# Patient Record
Sex: FEMALE | Race: WHITE | URBAN - METROPOLITAN AREA
[De-identification: names, ages, dates, MRNs, and addresses within clinical notes are randomized per-mention and may not be internally consistent; named-entity substitution may affect disease eponyms.]

---

## 2019-11-05 ENCOUNTER — OPTICAL OFFICE (OUTPATIENT)
Dept: URBAN - METROPOLITAN AREA CLINIC 146 | Facility: CLINIC | Age: 19
Setting detail: OPHTHALMOLOGY
End: 2019-11-05
Payer: COMMERCIAL

## 2019-11-05 ENCOUNTER — DOCTOR'S OFFICE (OUTPATIENT)
Dept: URBAN - METROPOLITAN AREA CLINIC 137 | Facility: CLINIC | Age: 19
Setting detail: OPHTHALMOLOGY
End: 2019-11-05
Payer: COMMERCIAL

## 2019-11-05 DIAGNOSIS — H52.223: ICD-10-CM

## 2019-11-05 PROCEDURE — V2020 VISION SVCS FRAMES PURCHASES: HCPCS | Performed by: OPTOMETRIST

## 2019-11-05 PROCEDURE — V2104 SPHEROCYLINDR 4.00D/2.12-4D: HCPCS | Performed by: OPTOMETRIST

## 2019-11-05 PROCEDURE — V2103 SPHEROCYLINDR 4.00D/12-2.00D: HCPCS | Performed by: OPTOMETRIST

## 2019-11-05 PROCEDURE — 92004 COMPRE OPH EXAM NEW PT 1/>: CPT | Performed by: OPTOMETRIST

## 2019-11-05 ASSESSMENT — REFRACTION_CURRENTRX
OS_OVR_VA: 20/
OD_CYLINDER: -1.50
OS_SPHERE: -2.25
OD_OVR_VA: 20/
OD_OVR_VA: 20/
OD_SPHERE: -2.75
OD_AXIS: 001
OS_AXIS: 107
OS_CYLINDER: -0.50
OD_OVR_VA: 20/
OS_OVR_VA: 20/
OS_OVR_VA: 20/

## 2019-11-05 ASSESSMENT — REFRACTION_MANIFEST
OS_VA3: 20/
OU_VA: 20/
OS_VA3: 20/
OS_VA1: 20/20
OS_SPHERE: -2.25
OS_VA1: 20/
OD_SPHERE: -3.00
OS_VA2: 20/
OD_VA3: 20/
OD_VA3: 20/
OD_AXIS: 5
OS_AXIS: 120
OS_CYLINDER: -0.25
OD_VA1: 20/
OD_VA2: 20/
OD_VA2: 20/
OU_VA: 20/
OD_VA1: 20/20
OS_VA2: 20/
OD_CYLINDER: -2.25

## 2019-11-05 ASSESSMENT — AXIALLENGTH_DERIVED
OS_AL: 24.4559
OD_AL: 25.1589
OS_AL: 24.5084
OD_AL: 24.885

## 2019-11-05 ASSESSMENT — SPHEQUIV_DERIVED
OD_SPHEQUIV: -3.5
OS_SPHEQUIV: -2.5
OD_SPHEQUIV: -4.125
OS_SPHEQUIV: -2.375

## 2019-11-05 ASSESSMENT — KERATOMETRY
OD_K1POWER_DIOPTERS: 42.50
OS_AXISANGLE_DEGREES: 174
OS_K1POWER_DIOPTERS: 43.25
OS_K2POWER_DIOPTERS: 44.25
OD_K2POWER_DIOPTERS: 45.25
OD_AXISANGLE_DEGREES: 180

## 2019-11-05 ASSESSMENT — VISUAL ACUITY
OS_BCVA: 20/30-1
OD_BCVA: 20/30

## 2019-11-05 ASSESSMENT — REFRACTION_AUTOREFRACTION
OD_AXIS: 179
OS_SPHERE: -2.00
OS_AXIS: 147
OS_CYLINDER: -1.00
OD_SPHERE: -2.50
OD_CYLINDER: -2.00

## 2019-11-05 ASSESSMENT — CONFRONTATIONAL VISUAL FIELD TEST (CVF)
OD_FINDINGS: FULL
OS_FINDINGS: FULL

## 2020-09-06 ENCOUNTER — HOSPITAL ENCOUNTER (EMERGENCY)
Facility: HOSPITAL | Age: 20
Discharge: HOME | End: 2020-09-06
Attending: EMERGENCY MEDICINE | Admitting: EMERGENCY MEDICINE
Payer: COMMERCIAL

## 2020-09-06 VITALS
HEART RATE: 84 BPM | BODY MASS INDEX: 22.31 KG/M2 | HEIGHT: 62 IN | SYSTOLIC BLOOD PRESSURE: 132 MMHG | DIASTOLIC BLOOD PRESSURE: 88 MMHG | OXYGEN SATURATION: 98 % | WEIGHT: 121.25 LBS | RESPIRATION RATE: 16 BRPM | TEMPERATURE: 97.9 F

## 2020-09-06 DIAGNOSIS — F41.9 ANXIETY: Primary | ICD-10-CM

## 2020-09-06 DIAGNOSIS — Z76.0 ENCOUNTER FOR MEDICATION REFILL: ICD-10-CM

## 2020-09-06 DIAGNOSIS — F32.A DEPRESSION: ICD-10-CM

## 2020-09-06 PROCEDURE — 99281 EMR DPT VST MAYX REQ PHY/QHP: CPT | Performed by: EMERGENCY MEDICINE

## 2020-09-06 PROCEDURE — 99281 EMR DPT VST MAYX REQ PHY/QHP: CPT

## 2020-09-06 RX ORDER — HYDROXYZINE PAMOATE 25 MG/1
25 CAPSULE ORAL
Qty: 30 CAPSULE | Refills: 0 | Status: SHIPPED | OUTPATIENT
Start: 2020-09-06 | End: 2020-10-06

## 2020-09-06 RX ORDER — VENLAFAXINE HYDROCHLORIDE 75 MG/1
75 CAPSULE, EXTENDED RELEASE ORAL DAILY
COMMUNITY
End: 2020-09-08

## 2020-09-06 RX ORDER — HYDROXYZINE PAMOATE 25 MG/1
25 CAPSULE ORAL DAILY
COMMUNITY
End: 2020-09-08

## 2020-09-06 RX ORDER — HYDROXYZINE HYDROCHLORIDE 25 MG/1
25 TABLET, FILM COATED ORAL ONCE
Status: COMPLETED | OUTPATIENT
Start: 2020-09-06 | End: 2020-09-06

## 2020-09-06 RX ORDER — VENLAFAXINE HYDROCHLORIDE 75 MG/1
75 CAPSULE, EXTENDED RELEASE ORAL DAILY
Qty: 30 CAPSULE | Refills: 0 | Status: SHIPPED | OUTPATIENT
Start: 2020-09-06 | End: 2020-10-06

## 2020-09-06 RX ADMIN — HYDROXYZINE HYDROCHLORIDE 25 MG: 25 TABLET ORAL at 20:34

## 2020-09-07 NOTE — DISCHARGE INSTRUCTIONS
Please make sure to follow-up with your psychiatrist in order to obtain more medications  If you have any worsening symptoms including thoughts of harming herself, harming other people, or anything that concern to, please return to the emergency department as he could be signs of worsening illness

## 2020-09-07 NOTE — ED PROVIDER NOTES
History  Chief Complaint   Patient presents with    Medication Refill     Pt presents to ED from home unable to refill psych meds  Effexor 75mg daily and vistril unknown dose  HPI   20-year-old female with history of depression, anxiety, migraines presenting to the emergency department with request for refilling her medications  Patient states that she has severe depression and anxiety which is normally managed with Effexor and Vistaril  Since the pandemic started, patient has been having trouble arranging telemedicine counseling with her psychiatrist and counselors  Patient has been without her medication for at least 1 month though has had several extra tabs of the Effexor which she has been saving  Patient presents because she feels that her depression is getting worse  Denies suicidal ideation or homicidal ideation, or hallucinations, but states that she starting to feel irritable and agitated  Prior to this, patient was in her state of usual health  Denies fever, headache, visual changes, neck pain, chest pain, shortness of breath, abdominal pain, nausea, vomiting, urinary symptoms, changes in stool, leg pain or leg swelling, rash  No sick contacts, no recent travel  Prior to Admission Medications   Prescriptions Last Dose Informant Patient Reported? Taking?   hydrOXYzine pamoate (VISTARIL) 25 mg capsule   Yes Yes   Sig: Take 25 mg by mouth daily   venlafaxine (EFFEXOR-XR) 75 mg 24 hr capsule   Yes Yes   Sig: Take 75 mg by mouth daily      Facility-Administered Medications: None       Past Medical History:   Diagnosis Date    Anxiety     Depression     Migraines        History reviewed  No pertinent surgical history  History reviewed  No pertinent family history  I have reviewed and agree with the history as documented      E-Cigarette/Vaping    E-Cigarette Use Never User      E-Cigarette/Vaping Substances     Social History     Tobacco Use    Smoking status: Never Smoker    Smokeless tobacco: Never Used   Substance Use Topics    Alcohol use: Not Currently    Drug use: Not Currently       Review of Systems   Constitutional: Negative for chills and fever  Respiratory: Negative for apnea, cough, choking, chest tightness, shortness of breath, wheezing and stridor  Cardiovascular: Negative for chest pain and leg swelling  Gastrointestinal: Negative for abdominal distention, abdominal pain, constipation, diarrhea, nausea and rectal pain  Genitourinary: Negative for dysuria and hematuria  Musculoskeletal: Negative for back pain, gait problem and neck pain  Skin: Negative for color change, pallor, rash and wound  Neurological: Negative for dizziness, tremors, seizures, syncope, facial asymmetry, speech difficulty, weakness, light-headedness, numbness and headaches  Psychiatric/Behavioral: Positive for agitation and dysphoric mood  Negative for self-injury and suicidal ideas  The patient is nervous/anxious  Physical Exam  Physical Exam  Vitals signs and nursing note reviewed  Constitutional:       General: She is not in acute distress  Appearance: She is well-developed  She is not diaphoretic  HENT:      Head: Normocephalic and atraumatic  Right Ear: External ear normal       Left Ear: External ear normal       Nose: Nose normal    Eyes:      General:         Right eye: No discharge  Left eye: No discharge  Conjunctiva/sclera: Conjunctivae normal       Pupils: Pupils are equal, round, and reactive to light  Neck:      Musculoskeletal: Normal range of motion and neck supple  Cardiovascular:      Rate and Rhythm: Normal rate and regular rhythm  Heart sounds: Normal heart sounds  No murmur  No friction rub  No gallop  Pulmonary:      Effort: Pulmonary effort is normal  No respiratory distress  Breath sounds: Normal breath sounds  No stridor  No wheezing or rales  Chest:      Chest wall: No tenderness     Abdominal:      General: Bowel sounds are normal  There is no distension  Palpations: Abdomen is soft  There is no mass  Tenderness: There is no abdominal tenderness  There is no guarding or rebound  Hernia: No hernia is present  Musculoskeletal: Normal range of motion  General: No tenderness or deformity  Skin:     General: Skin is warm and dry  Capillary Refill: Capillary refill takes less than 2 seconds  Neurological:      Mental Status: She is alert and oriented to person, place, and time  Psychiatric:      Comments: Blunt/flat affect         Vital Signs  ED Triage Vitals   Temperature Pulse Respirations Blood Pressure SpO2   09/06/20 1956 09/06/20 1955 09/06/20 1955 09/06/20 1955 09/06/20 1955   97 9 °F (36 6 °C) 84 16 132/88 98 %      Temp Source Heart Rate Source Patient Position - Orthostatic VS BP Location FiO2 (%)   09/06/20 1956 09/06/20 1955 09/06/20 1955 09/06/20 1955 --   Oral Monitor Lying Right arm       Pain Score       --                  Vitals:    09/06/20 1955   BP: 132/88   Pulse: 84   Patient Position - Orthostatic VS: Lying         Visual Acuity      ED Medications  Medications   hydrOXYzine HCL (ATARAX) tablet 25 mg (has no administration in time range)       Diagnostic Studies  Results Reviewed     None                 No orders to display              Procedures  Procedures         ED Course  ED Course as of Sep 06 2031   Daiana Madrigal Sep 06, 2020   213 57-year-old female presenting for medication refills  2003 Vital signs WNL  Blood Pressure: 132/88   2028 Physical exam is grossly unremarkable  2028 Differential diagnosis includes encounter for medication refill, anxiety, depression  Lab work and imaging is not indicated at this time  Reviewed patient's lab work to confirm home doses of medications  Prescriptions were sent to pharmacy  At this time, since it is late, patient is requesting her nighttime Atarax, which was provided        2029 Upon discharge, patient was fully alert, oriented, GCS 15, ambulatory, without any further complaints  MDM      Disposition  Final diagnoses:   Anxiety   Depression   Encounter for medication refill     Time reflects when diagnosis was documented in both MDM as applicable and the Disposition within this note     Time User Action Codes Description Comment    9/6/2020  8:25 PM Julito Mantis Add [F41 9] Anxiety     9/6/2020  8:25 PM Orvin Soulier [F32 9] Depression     9/6/2020  8:25 PM Julito Mantis Add [Z76 0] Encounter for medication refill       ED Disposition     ED Disposition Condition Date/Time Comment    Discharge Stable Sun Sep 6, 2020  8:25 PM Megan Plasencia discharge to home/self care  Follow-up Information     Follow up With Specialties Details Why Contact Info    Psychiatry  Schedule an appointment as soon as possible for a visit in 2 days  Please follow up with your counselors for further refills  Patient's Medications   Discharge Prescriptions    HYDROXYZINE PAMOATE (VISTARIL) 25 MG CAPSULE    Take 1 capsule (25 mg total) by mouth daily at bedtime       Start Date: 9/6/2020  End Date: 10/6/2020       Order Dose: 25 mg       Quantity: 30 capsule    Refills: 0    VENLAFAXINE (EFFEXOR-XR) 75 MG 24 HR CAPSULE    Take 1 capsule (75 mg total) by mouth daily       Start Date: 9/6/2020  End Date: 10/6/2020       Order Dose: 75 mg       Quantity: 30 capsule    Refills: 0     No discharge procedures on file      PDMP Review     None          ED Provider  Electronically Signed by           Le Richards MD  09/06/20 2031

## 2020-09-08 ENCOUNTER — HOSPITAL ENCOUNTER (EMERGENCY)
Facility: HOSPITAL | Age: 20
Discharge: HOME | End: 2020-09-08
Attending: EMERGENCY MEDICINE | Admitting: EMERGENCY MEDICINE
Payer: COMMERCIAL

## 2020-09-08 ENCOUNTER — APPOINTMENT (EMERGENCY)
Dept: RADIOLOGY | Facility: HOSPITAL | Age: 20
End: 2020-09-08
Payer: COMMERCIAL

## 2020-09-08 VITALS
HEART RATE: 89 BPM | BODY MASS INDEX: 21.62 KG/M2 | DIASTOLIC BLOOD PRESSURE: 80 MMHG | TEMPERATURE: 97.6 F | RESPIRATION RATE: 16 BRPM | OXYGEN SATURATION: 100 % | WEIGHT: 118.2 LBS | SYSTOLIC BLOOD PRESSURE: 126 MMHG

## 2020-09-08 DIAGNOSIS — F41.9 ANXIETY: ICD-10-CM

## 2020-09-08 DIAGNOSIS — E86.0 MILD DEHYDRATION: Primary | ICD-10-CM

## 2020-09-08 DIAGNOSIS — Z88.9 HX OF ALLERGIC REACTION: ICD-10-CM

## 2020-09-08 LAB
ALBUMIN SERPL BCP-MCNC: 5 G/DL (ref 3.4–4.8)
ALP SERPL-CCNC: 74.2 U/L (ref 35–140)
ALT SERPL W P-5'-P-CCNC: 15 U/L (ref 5–54)
ANION GAP SERPL CALCULATED.3IONS-SCNC: 9 MMOL/L (ref 4–13)
AST SERPL W P-5'-P-CCNC: 16 U/L (ref 15–41)
BASOPHILS # BLD AUTO: 0.03 THOUSANDS/ΜL (ref 0–0.1)
BASOPHILS NFR BLD AUTO: 0 % (ref 0–1)
BILIRUB SERPL-MCNC: 0.29 MG/DL (ref 0.3–1.2)
BUN SERPL-MCNC: 14 MG/DL (ref 6–20)
CALCIUM SERPL-MCNC: 9.9 MG/DL (ref 8.4–10.2)
CHLORIDE SERPL-SCNC: 104 MMOL/L (ref 96–108)
CO2 SERPL-SCNC: 26 MMOL/L (ref 22–33)
CREAT SERPL-MCNC: 0.89 MG/DL (ref 0.4–1.1)
D DIMER PPP FEU-MCNC: <0.19 MG/L FEU (ref 0.19–0.49)
EOSINOPHIL # BLD AUTO: 0.03 THOUSAND/ΜL (ref 0–0.61)
EOSINOPHIL NFR BLD AUTO: 0 % (ref 0–6)
ERYTHROCYTE [DISTWIDTH] IN BLOOD BY AUTOMATED COUNT: 13.1 % (ref 11.6–15.1)
GFR SERPL CREATININE-BSD FRML MDRD: 93 ML/MIN/1.73SQ M
GLUCOSE SERPL-MCNC: 120 MG/DL (ref 65–140)
HCT VFR BLD AUTO: 42 % (ref 34.8–46.1)
HGB BLD-MCNC: 14 G/DL (ref 11.5–15.4)
IMM GRANULOCYTES # BLD AUTO: 0.02 THOUSAND/UL (ref 0–0.2)
IMM GRANULOCYTES NFR BLD AUTO: 0 % (ref 0–2)
LYMPHOCYTES # BLD AUTO: 1.65 THOUSANDS/ΜL (ref 0.6–4.47)
LYMPHOCYTES NFR BLD AUTO: 23 % (ref 14–44)
MCH RBC QN AUTO: 27.3 PG (ref 26.8–34.3)
MCHC RBC AUTO-ENTMCNC: 33.3 G/DL (ref 31.4–37.4)
MCV RBC AUTO: 82 FL (ref 82–98)
MONOCYTES # BLD AUTO: 0.49 THOUSAND/ΜL (ref 0.17–1.22)
MONOCYTES NFR BLD AUTO: 7 % (ref 4–12)
NEUTROPHILS # BLD AUTO: 5.07 THOUSANDS/ΜL (ref 1.85–7.62)
NEUTS SEG NFR BLD AUTO: 70 % (ref 43–75)
PLATELET # BLD AUTO: 310 THOUSANDS/UL (ref 149–390)
PMV BLD AUTO: 9.3 FL (ref 8.9–12.7)
POTASSIUM SERPL-SCNC: 3.9 MMOL/L (ref 3.5–5)
PROT SERPL-MCNC: 7.6 G/DL (ref 6.4–8.3)
RBC # BLD AUTO: 5.13 MILLION/UL (ref 3.81–5.12)
SODIUM SERPL-SCNC: 139 MMOL/L (ref 133–145)
WBC # BLD AUTO: 7.29 THOUSAND/UL (ref 4.31–10.16)

## 2020-09-08 PROCEDURE — 99282 EMERGENCY DEPT VISIT SF MDM: CPT | Performed by: PHYSICIAN ASSISTANT

## 2020-09-08 PROCEDURE — 99284 EMERGENCY DEPT VISIT MOD MDM: CPT

## 2020-09-08 PROCEDURE — 93005 ELECTROCARDIOGRAM TRACING: CPT

## 2020-09-08 PROCEDURE — 85025 COMPLETE CBC W/AUTO DIFF WBC: CPT | Performed by: PHYSICIAN ASSISTANT

## 2020-09-08 PROCEDURE — 71046 X-RAY EXAM CHEST 2 VIEWS: CPT

## 2020-09-08 PROCEDURE — 36415 COLL VENOUS BLD VENIPUNCTURE: CPT | Performed by: PHYSICIAN ASSISTANT

## 2020-09-08 PROCEDURE — 85379 FIBRIN DEGRADATION QUANT: CPT | Performed by: PHYSICIAN ASSISTANT

## 2020-09-08 PROCEDURE — 96361 HYDRATE IV INFUSION ADD-ON: CPT

## 2020-09-08 PROCEDURE — 96360 HYDRATION IV INFUSION INIT: CPT

## 2020-09-08 PROCEDURE — 80053 COMPREHEN METABOLIC PANEL: CPT | Performed by: PHYSICIAN ASSISTANT

## 2020-09-08 RX ADMIN — SODIUM CHLORIDE 1000 ML: 0.9 INJECTION, SOLUTION INTRAVENOUS at 15:06

## 2020-09-08 NOTE — Clinical Note
Jackson Kaufman was seen and treated in our emergency department on 9/8/2020  Diagnosis:     Lali    She may return on this date: 09/09/2020         If you have any questions or concerns, please don't hesitate to call        Carmencita Davies PA-C    ______________________________           _______________          _______________  Hospital Representative                              Date                                Time

## 2020-09-08 NOTE — ED PROVIDER NOTES
History  Chief Complaint   Patient presents with    Allergic Reaction     presents with multiple complaits, "cold sweats" this AM, dizziness, states "I think I'm having an allergic reaction", states "tingling" of throat and roof of mouth and facial swelling since this AM  Restarted effexor yesterday, otherwise no new exposure  49-year-old female comes in today concern for an allergic reaction  She reports that since yesterday she has been having some dizziness which she describes as pre-syncopal, tingling of the roof of her mouth, perioral numbness, bilateral hand tingling, occasional feelings of chest tightness and some shortness of breath  She also felt as though she had swelling of her bilateral cheeks  She thought that this was an allergic reaction  She did not take any Benadryl prior to arrival   She reports a prior history of possible allergies to citrus when she was younger, however she was never tested  She reports that yesterday she drank fruit punch, gatorade, and ate an asian bowl  She has had the fruit punch and gatorade before, but not the asian bowl  Reports she has not been eating or drinking well lately due to her depression and starting a new job  She reports she was seen in the ED 4-5 days ago because she was not doing well with her mental health and wanted to restart her effexor  She hadn't been on this for 1 5 months, but restarted it at her full dose 3-4 days ago  She has never had any problems with her Effexor in the past   She did make an appointment with her psychiatrist, however she is unsure when it is  She does report feeling mildly anxious  Prior to Admission Medications   Prescriptions Last Dose Informant Patient Reported?  Taking?   hydrOXYzine pamoate (VISTARIL) 25 mg capsule 9/7/2020 at Unknown time  No Yes   Sig: Take 1 capsule (25 mg total) by mouth daily at bedtime   venlafaxine (EFFEXOR-XR) 75 mg 24 hr capsule 9/8/2020 at Unknown time  No Yes   Sig: Take 1 capsule (75 mg total) by mouth daily      Facility-Administered Medications: None       Past Medical History:   Diagnosis Date    Anxiety     Depression     Migraines        History reviewed  No pertinent surgical history  History reviewed  No pertinent family history  I have reviewed and agree with the history as documented  E-Cigarette/Vaping    E-Cigarette Use Never User      E-Cigarette/Vaping Substances     Social History     Tobacco Use    Smoking status: Never Smoker    Smokeless tobacco: Never Used   Substance Use Topics    Alcohol use: Not Currently    Drug use: Not Currently       Review of Systems   Constitutional: Positive for appetite change  Negative for activity change  Eyes: Negative for visual disturbance  Respiratory: Positive for chest tightness and shortness of breath  Cardiovascular: Negative for chest pain  Musculoskeletal: Negative for back pain  Skin: Negative for color change  Neurological: Positive for dizziness, light-headedness and numbness  Negative for headaches  Psychiatric/Behavioral: Negative for behavioral problems  Physical Exam  Physical Exam  Constitutional:       General: She is not in acute distress  Appearance: She is well-developed  HENT:      Head: Normocephalic and atraumatic  Right Ear: Tympanic membrane normal       Left Ear: Tympanic membrane normal       Nose: Nose normal       Mouth/Throat:      Mouth: Mucous membranes are moist       Pharynx: No oropharyngeal exudate  Eyes:      Conjunctiva/sclera: Conjunctivae normal    Cardiovascular:      Rate and Rhythm: Regular rhythm  Tachycardia present  Heart sounds: Normal heart sounds  No murmur  Pulmonary:      Effort: Pulmonary effort is normal  No respiratory distress  Breath sounds: Normal breath sounds  Abdominal:      General: Abdomen is flat  Bowel sounds are normal  There is no distension  Palpations: Abdomen is soft  Tenderness:  There is no abdominal tenderness  Musculoskeletal: Normal range of motion  Skin:     General: Skin is warm and dry  Findings: No rash  Neurological:      Mental Status: She is alert and oriented to person, place, and time     Psychiatric:         Behavior: Behavior normal          Vital Signs  ED Triage Vitals [09/08/20 1445]   Temperature Pulse Respirations Blood Pressure SpO2   97 6 °F (36 4 °C) (!) 112 16 146/89 96 %      Temp Source Heart Rate Source Patient Position - Orthostatic VS BP Location FiO2 (%)   Tympanic Monitor Sitting Left arm --      Pain Score       --           Vitals:    09/08/20 1445 09/08/20 1611   BP: 146/89 126/80   Pulse: (!) 112 89   Patient Position - Orthostatic VS: Sitting Lying         Visual Acuity      ED Medications  Medications   sodium chloride 0 9 % bolus 1,000 mL (1,000 mL Intravenous New Bag 9/8/20 1506)       Diagnostic Studies  Results Reviewed     Procedure Component Value Units Date/Time    D-Dimer [[id number]]  (Abnormal) Collected:  09/08/20 1506    Lab Status:  Final result Specimen:  Blood from Arm, Right Updated:  09/08/20 1538     D-Dimer, Quant  <0 19 mg/L [facility]     Comprehensive metabolic panel [[id number]]  (Abnormal) Collected:  09/08/20 1506    Lab Status:  Final result Specimen:  Blood from Arm, Right Updated:  09/08/20 1534     Sodium 139 mmol/L      Potassium 3 9 mmol/L      Chloride 104 mmol/L      CO2 26 mmol/L      ANION GAP 9 mmol/L      BUN 14 mg/dL      Creatinine 0 89 mg/dL      Glucose 120 mg/dL      Calcium 9 9 mg/dL      AST 16 U/L      ALT 15 U/L      Alkaline Phosphatase 74 2 U/L      Total Protein 7 6 g/dL      Albumin 5 0 g/dL      Total Bilirubin 0 29 mg/dL      eGFR 93 ml/min/1 73sq m     Narrative:       [place] guidelines for Chronic Kidney Disease (CKD):     Stage 1 with normal or high GFR (GFR > 90 mL/min/1 73 square meters)    Stage 2 Mild CKD (GFR = 60-89 mL/min/1 73 square meters)    Stage 3A Moderate CKD (GFR = 45-59 mL/min/1 73 square meters)    Stage 3B Moderate CKD (GFR = 30-44 mL/min/1 73 square meters)    Stage 4 Severe CKD (GFR = 15-29 mL/min/1 73 square meters)    Stage 5 End Stage CKD (GFR <15 mL/min/1 73 square meters)  Note: GFR calculation is accurate only with a steady state creatinine    CBC and differential [[id number]]  (Abnormal) Collected:  09/08/20 1506    Lab Status:  Final result Specimen:  Blood from Arm, Right Updated:  09/08/20 1512     WBC 7 29 Thousand/uL      RBC 5 13 Million/uL      Hemoglobin 14 0 g/dL      Hematocrit 42 0 %      MCV 82 fL      MCH 27 3 pg      MCHC 33 3 g/dL      RDW 13 1 %      MPV 9 3 fL      Platelets 012 Thousands/uL      Neutrophils Relative 70 %      Immat GRANS % 0 %      Lymphocytes Relative 23 %      Monocytes Relative 7 %      Eosinophils Relative 0 %      Basophils Relative 0 %      Neutrophils Absolute 5 07 Thousands/µL      Immature Grans Absolute 0 02 Thousand/uL      Lymphocytes Absolute 1 65 Thousands/µL      Monocytes Absolute 0 49 Thousand/µL      Eosinophils Absolute 0 03 Thousand/µL      Basophils Absolute 0 03 Thousands/µL                  XR chest 2 views   Final Result by Umang Villagomez MD (09/08 9047)      No acute cardiopulmonary disease  Workstation performed: [id number]                    Procedures  Procedures         ED Course  ED Course as of Sep 08 1638   Tue Sep 08, 2020   1608 Patient reports improvement of her symptoms after 1L saline  Asks for a work note and referral to allergist to be tested for citrus allergy                                          MDM  Number of Diagnoses or Management Options  Anxiety:   Hx of allergic reaction:   Mild dehydration:   Diagnosis management comments: Patient who had worsening of her mood recently decided to restart her FX or without seeing her psychiatrist   Patient notes a decreased appetite recently and has not been drinking well    She arrives slightly tachycardic which improved after 1 L NS favoring dehydration  Her mouth tingling and hand tingling is likely related to anxiety and hyperventilation rather than an allergic reaction  She did not have any type of urticarial rash and did not take any Benadryl  Disposition  Final diagnoses:   Mild dehydration   Anxiety   Hx of allergic reaction     Time reflects when diagnosis was documented in both MDM as applicable and the Disposition within this note     Time User Action Codes Description Comment    9/8/2020  4:31 PM Dorrene Bailer Add [E86 0] Mild dehydration     9/8/2020  4:31 PM Dorrene Bailer Add [F41 9] Anxiety     9/8/2020  4:32 PM Dorrene Bailer Add [Z88 9] Hx of allergic reaction       ED Disposition     ED Disposition Condition Date/Time Comment    Discharge Stable Tue Sep 8, 2020  4:30 PM Roger Dye discharge to home/self care              Follow-up Information     Follow up With Specialties Details Why Contact Info    Baljit Jeffries MD Allergy   [address]  [address] [place] [address]  [phone]      Your Psychiatrist  Schedule an appointment as soon as possible for a visit             Patient's Medications   Discharge Prescriptions    No medications on file         PDMP Review     None          ED Provider  Electronically Signed by           Carlos Cobb PA-C  09/08/20 6989

## 2020-09-09 LAB
ATRIAL RATE: 107 BPM
P AXIS: 64 DEGREES
PR INTERVAL: 132 MS
QRS AXIS: 63 DEGREES
QRSD INTERVAL: 82 MS
QT INTERVAL: 449 MS
QTC INTERVAL: 591 MS
T WAVE AXIS: 254 DEGREES
VENTRICULAR RATE: 104 BPM

## 2020-09-09 PROCEDURE — 93010 ELECTROCARDIOGRAM REPORT: CPT | Performed by: INTERNAL MEDICINE

## 2021-01-21 ENCOUNTER — OPTICAL OFFICE (OUTPATIENT)
Dept: URBAN - METROPOLITAN AREA CLINIC 146 | Facility: CLINIC | Age: 21
Setting detail: OPHTHALMOLOGY
End: 2021-01-21
Payer: COMMERCIAL

## 2021-01-21 ENCOUNTER — DOCTOR'S OFFICE (OUTPATIENT)
Dept: URBAN - METROPOLITAN AREA CLINIC 137 | Facility: CLINIC | Age: 21
Setting detail: OPHTHALMOLOGY
End: 2021-01-21
Payer: COMMERCIAL

## 2021-01-21 DIAGNOSIS — H52.223: ICD-10-CM

## 2021-01-21 DIAGNOSIS — H52.13: ICD-10-CM

## 2021-01-21 PROCEDURE — V2103 SPHEROCYLINDR 4.00D/12-2.00D: HCPCS | Performed by: OPTOMETRIST

## 2021-01-21 PROCEDURE — 92014 COMPRE OPH EXAM EST PT 1/>: CPT | Performed by: OPTOMETRIST

## 2021-01-21 PROCEDURE — V2020 VISION SVCS FRAMES PURCHASES: HCPCS | Performed by: OPTOMETRIST

## 2021-01-21 ASSESSMENT — REFRACTION_CURRENTRX
OD_AXIS: 005
OS_OVR_VA: 20/
OS_VPRISM_DIRECTION: SV
OS_CYLINDER: -0.25
OS_AXIS: 120
OS_SPHERE: -2.25
OD_OVR_VA: 20/
OD_VPRISM_DIRECTION: SV
OD_CYLINDER: -2.25
OD_SPHERE: -3.00

## 2021-01-21 ASSESSMENT — REFRACTION_MANIFEST
OD_VA1: 20/20
OD_SPHERE: -3.00
OD_CYLINDER: -2.25
OD_CYLINDER: -2.00
OS_AXIS: 150
OD_SPHERE: -2.75
OS_VA1: 20/20
OD_AXIS: 5
OS_CYLINDER: -0.25
OD_VA1: 20/20
OD_AXIS: 175
OS_VA1: 20/20
OS_CYLINDER: -0.50
OS_SPHERE: -2.25
OS_SPHERE: -2.25
OS_AXIS: 120

## 2021-01-21 ASSESSMENT — SPHEQUIV_DERIVED
OD_SPHEQUIV: -3.75
OS_SPHEQUIV: -2.375
OD_SPHEQUIV: -4.125
OS_SPHEQUIV: -2.5
OD_SPHEQUIV: -4
OS_SPHEQUIV: -2.625

## 2021-01-21 ASSESSMENT — KERATOMETRY
OD_K2POWER_DIOPTERS: 45.25
OS_AXISANGLE_DEGREES: 174
OD_AXISANGLE_DEGREES: 180
OD_K1POWER_DIOPTERS: 42.50
OS_K1POWER_DIOPTERS: 43.25
OS_K2POWER_DIOPTERS: 44.25

## 2021-01-21 ASSESSMENT — REFRACTION_AUTOREFRACTION
OS_CYLINDER: -0.25
OD_CYLINDER: -2.00
OD_SPHERE: -3.00
OD_AXIS: 176
OS_SPHERE: -2.50
OS_AXIS: 127

## 2021-01-21 ASSESSMENT — AXIALLENGTH_DERIVED
OS_AL: 24.4559
OS_AL: 24.561
OD_AL: 25.1589
OS_AL: 24.5084
OD_AL: 24.9938
OD_AL: 25.1036

## 2021-01-21 ASSESSMENT — TONOMETRY
OS_IOP_MMHG: 15
OD_IOP_MMHG: 15

## 2021-01-21 ASSESSMENT — CONFRONTATIONAL VISUAL FIELD TEST (CVF)
OS_FINDINGS: FULL
OD_FINDINGS: FULL

## 2021-01-21 ASSESSMENT — VISUAL ACUITY
OS_BCVA: 20/30
OD_BCVA: 20/25

## 2021-08-04 ENCOUNTER — HOSPITAL ENCOUNTER (EMERGENCY)
Facility: HOSPITAL | Age: 21
Discharge: HOME | End: 2021-08-04
Attending: EMERGENCY MEDICINE | Admitting: EMERGENCY MEDICINE
Payer: COMMERCIAL

## 2021-08-04 VITALS
TEMPERATURE: 98.5 F | DIASTOLIC BLOOD PRESSURE: 70 MMHG | SYSTOLIC BLOOD PRESSURE: 120 MMHG | HEART RATE: 80 BPM | RESPIRATION RATE: 16 BRPM | OXYGEN SATURATION: 99 %

## 2021-08-04 DIAGNOSIS — N39.0 ACUTE UTI: Primary | ICD-10-CM

## 2021-08-04 DIAGNOSIS — R10.84 GENERALIZED ABDOMINAL PAIN: ICD-10-CM

## 2021-08-04 LAB
ALBUMIN SERPL BCP-MCNC: 4.6 G/DL (ref 3.5–5)
ALP SERPL-CCNC: 81 U/L (ref 46–116)
ALT SERPL W P-5'-P-CCNC: 26 U/L (ref 12–78)
ANION GAP SERPL CALCULATED.3IONS-SCNC: 8 MMOL/L (ref 4–13)
AST SERPL W P-5'-P-CCNC: 16 U/L (ref 5–45)
BACTERIA UR QL AUTO: ABNORMAL /HPF
BASOPHILS # BLD AUTO: 0.04 THOUSANDS/ΜL (ref 0–0.1)
BASOPHILS NFR BLD AUTO: 1 % (ref 0–1)
BILIRUB SERPL-MCNC: 0.32 MG/DL (ref 0.2–1)
BILIRUB UR QL STRIP: NEGATIVE
BUN SERPL-MCNC: 11 MG/DL (ref 5–25)
CALCIUM SERPL-MCNC: 9.5 MG/DL (ref 8.3–10.1)
CHLORIDE SERPL-SCNC: 104 MMOL/L (ref 100–108)
CLARITY UR: CLEAR
CO2 SERPL-SCNC: 28 MMOL/L (ref 21–32)
COLOR UR: YELLOW
CREAT SERPL-MCNC: 1.04 MG/DL (ref 0.6–1.3)
EOSINOPHIL # BLD AUTO: 0.07 THOUSAND/ΜL (ref 0–0.61)
EOSINOPHIL NFR BLD AUTO: 1 % (ref 0–6)
ERYTHROCYTE [DISTWIDTH] IN BLOOD BY AUTOMATED COUNT: 13.2 % (ref 11.6–15.1)
EXT PREG TEST URINE: NEGATIVE
EXT. CONTROL ED NAV: NORMAL
GFR SERPL CREATININE-BSD FRML MDRD: 76 ML/MIN/1.73SQ M
GLUCOSE SERPL-MCNC: 98 MG/DL (ref 65–140)
GLUCOSE UR STRIP-MCNC: NEGATIVE MG/DL
HCT VFR BLD AUTO: 43 % (ref 34.8–46.1)
HGB BLD-MCNC: 16.1 G/DL (ref 11.5–15.4)
HGB UR QL STRIP.AUTO: NEGATIVE
IMM GRANULOCYTES # BLD AUTO: 0.02 THOUSAND/UL (ref 0–0.2)
IMM GRANULOCYTES NFR BLD AUTO: 0 % (ref 0–2)
KETONES UR STRIP-MCNC: NEGATIVE MG/DL
LEUKOCYTE ESTERASE UR QL STRIP: ABNORMAL
LIPASE SERPL-CCNC: 138 U/L (ref 73–393)
LYMPHOCYTES # BLD AUTO: 2.23 THOUSANDS/ΜL (ref 0.6–4.47)
LYMPHOCYTES NFR BLD AUTO: 33 % (ref 14–44)
MCH RBC QN AUTO: 31.9 PG (ref 26.8–34.3)
MCHC RBC AUTO-ENTMCNC: 37.4 G/DL (ref 31.4–37.4)
MCV RBC AUTO: 85 FL (ref 82–98)
MONOCYTES # BLD AUTO: 0.58 THOUSAND/ΜL (ref 0.17–1.22)
MONOCYTES NFR BLD AUTO: 9 % (ref 4–12)
NEUTROPHILS # BLD AUTO: 3.88 THOUSANDS/ΜL (ref 1.85–7.62)
NEUTS SEG NFR BLD AUTO: 56 % (ref 43–75)
NITRITE UR QL STRIP: NEGATIVE
NON-SQ EPI CELLS URNS QL MICRO: ABNORMAL /HPF
NRBC BLD AUTO-RTO: 0 /100 WBCS
PH UR STRIP.AUTO: 7.5 [PH]
PLATELET # BLD AUTO: 317 THOUSANDS/UL (ref 149–390)
PMV BLD AUTO: 9.3 FL (ref 8.9–12.7)
POTASSIUM SERPL-SCNC: 3.9 MMOL/L (ref 3.5–5.3)
PROT SERPL-MCNC: 8.6 G/DL (ref 6.4–8.2)
PROT UR STRIP-MCNC: NEGATIVE MG/DL
RBC # BLD AUTO: 5.04 MILLION/UL (ref 3.81–5.12)
RBC #/AREA URNS AUTO: ABNORMAL /HPF
SODIUM SERPL-SCNC: 140 MMOL/L (ref 136–145)
SP GR UR STRIP.AUTO: 1.02 (ref 1–1.03)
TROPONIN I SERPL-MCNC: <0.02 NG/ML
UROBILINOGEN UR QL STRIP.AUTO: 0.2 E.U./DL
WBC # BLD AUTO: 6.82 THOUSAND/UL (ref 4.31–10.16)
WBC #/AREA URNS AUTO: ABNORMAL /HPF

## 2021-08-04 PROCEDURE — 87077 CULTURE AEROBIC IDENTIFY: CPT

## 2021-08-04 PROCEDURE — 84484 ASSAY OF TROPONIN QUANT: CPT | Performed by: EMERGENCY MEDICINE

## 2021-08-04 PROCEDURE — 87086 URINE CULTURE/COLONY COUNT: CPT

## 2021-08-04 PROCEDURE — 83690 ASSAY OF LIPASE: CPT

## 2021-08-04 PROCEDURE — 80053 COMPREHEN METABOLIC PANEL: CPT | Performed by: EMERGENCY MEDICINE

## 2021-08-04 PROCEDURE — 87186 SC STD MICRODIL/AGAR DIL: CPT

## 2021-08-04 PROCEDURE — 85025 COMPLETE CBC W/AUTO DIFF WBC: CPT | Performed by: EMERGENCY MEDICINE

## 2021-08-04 PROCEDURE — 81025 URINE PREGNANCY TEST: CPT

## 2021-08-04 PROCEDURE — 81001 URINALYSIS AUTO W/SCOPE: CPT

## 2021-08-04 PROCEDURE — 99284 EMERGENCY DEPT VISIT MOD MDM: CPT

## 2021-08-04 PROCEDURE — 36415 COLL VENOUS BLD VENIPUNCTURE: CPT

## 2021-08-04 PROCEDURE — 99284 EMERGENCY DEPT VISIT MOD MDM: CPT | Performed by: EMERGENCY MEDICINE

## 2021-08-04 RX ORDER — CEPHALEXIN 250 MG/1
500 CAPSULE ORAL ONCE
Status: COMPLETED | OUTPATIENT
Start: 2021-08-04 | End: 2021-08-04

## 2021-08-04 RX ORDER — FAMOTIDINE 20 MG/1
20 TABLET, FILM COATED ORAL 2 TIMES DAILY
Qty: 20 TABLET | Refills: 0 | Status: SHIPPED | OUTPATIENT
Start: 2021-08-04 | End: 2021-08-14

## 2021-08-04 RX ORDER — CEPHALEXIN 500 MG/1
500 CAPSULE ORAL EVERY 12 HOURS SCHEDULED
Qty: 14 CAPSULE | Refills: 0 | Status: SHIPPED | OUTPATIENT
Start: 2021-08-04 | End: 2021-08-11

## 2021-08-04 RX ADMIN — CEPHALEXIN 500 MG: 250 CAPSULE ORAL at 19:39

## 2021-08-04 NOTE — ED ATTENDING ATTESTATION
8/4/2021  IYoana DO, saw and evaluated the patient  I have discussed the patient with the resident/non-physician practitioner and agree with the resident's/non-physician practitioner's findings, Plan of Care, and MDM as documented in the resident's/non-physician practitioner's note, except where noted  All available labs and Radiology studies were reviewed  I was present for key portions of any procedure(s) performed by the resident/non-physician practitioner and I was immediately available to provide assistance  At this point I agree with the current assessment done in the Emergency Department  I have conducted an independent evaluation of this patient a history and physical is as follows:    24 yo F presenting to the ED w/ crampy intermittent abdominal pain for the past week  She admits to eating a poor diet, mostly fast food and other takeout foods due to her current living situation  No n/v/d  Hx of constipation, and anxiety  No fevers, chills, urinary symptoms, blood in stool, denies pregnancy  On physical exam: pt very well appearing, in NAD  mucous membranes moist   CTA b/l , heart RRR  Abdomen NT, ND, no R/R/G  Normal bowel sounds  Neuro intact, gcs 15  Cap refill < 2 sec, skin warm and dry  ED Course  ED Course as of Aug 04 2359   Wed Aug 04, 2021   1913 Pt noted to have a UTI, will start on keflex  Also start on pepcid for suspected gastritis vs ulcer                Critical Care Time  Procedures

## 2021-08-04 NOTE — Clinical Note
Nicki Negron was seen and treated in our emergency department on 8/4/2021  Diagnosis:     Isis Yang  may return to work on return date  She may return on this date: 08/05/2021    Nicki Negron was seen in our ED today (8/4/2021)  If you have any questions or concerns, please don't hesitate to call        Ena Hampton MD    ______________________________           _______________          _______________  [facility] Representative                              Date                                Time

## 2021-08-04 NOTE — ED PROVIDER NOTES
History  Chief Complaint   Patient presents with    Abdominal Pain     patient states shes been having abdominal pain for about a week  Pain comes and goes and is a sharp pain  +N/V  Anson Gamez comes to the ED today after one week of episodic, abdominal pain  Frequency is 2-3 times a day that happens both prior and after meals  She describes the pain as crampy most of the time but has felt sharp  She describes that she often has "issues with constipation"  She describes these issues as feeling cramps or feeling that she has to go to the bathroom but then is unable to pass a BM  She states that this frequency and quality of bowel movements has not changes since she has been experiencing symptoms  She denies any blood or black components of her bowel movements  She describes one episode of nausea with a feeling of "needing to vomit" but had no episode of episode  She denies any fevers, chills, CP, SOB, rashes, numbness, tingling, LOC, changes in her normal bowel or bladder habits                    History provided by:  Patient   used: No    Abdominal Pain  Pain location:  Generalized  Pain quality: cramping and sharp    Pain radiates to:  Does not radiate  Pain severity:  Moderate  Onset quality:  Sudden  Duration:  1 week  Timing:  Sporadic  Progression:  Unchanged  Chronicity:  Recurrent  Context: diet changes    Context: not eating, not previous surgeries, not recent travel, not retching, not suspicious food intake and not trauma    Relieved by:  Nothing  Worsened by:  Nothing  Ineffective treatments:  None tried  Associated symptoms: no chest pain, no chills, no cough, no dysuria, no fatigue, no fever, no hematemesis, no hematochezia, no hematuria, no melena, no shortness of breath, no sore throat and no vomiting    Risk factors: not elderly, has not had multiple surgeries, not pregnant and no recent hospitalization        Prior to Admission Medications   Prescriptions Last Dose Informant Patient Reported? Taking?   hydrOXYzine pamoate (VISTARIL) 25 mg capsule   No No   Sig: Take 1 capsule (25 mg total) by mouth daily at bedtime   venlafaxine (EFFEXOR-XR) 75 mg 24 hr capsule   No No   Sig: Take 1 capsule (75 mg total) by mouth daily      Facility-Administered Medications: None       Past Medical History:   Diagnosis Date    Anxiety     Depression     Migraines        History reviewed  No pertinent surgical history  History reviewed  No pertinent family history  I have reviewed and agree with the history as documented  E-Cigarette/Vaping    E-Cigarette Use Never User      E-Cigarette/Vaping Substances     Social History     Tobacco Use    Smoking status: Never Smoker    Smokeless tobacco: Never Used   Vaping Use    Vaping Use: Never used   Substance Use Topics    Alcohol use: Not Currently    Drug use: Not Currently        Review of Systems   Constitutional: Negative for chills, fatigue and fever  HENT: Negative for ear pain and sore throat  Eyes: Negative for pain and visual disturbance  Respiratory: Negative for cough and shortness of breath  Cardiovascular: Negative for chest pain and palpitations  Gastrointestinal: Positive for abdominal pain  Negative for hematemesis, hematochezia, melena and vomiting  Genitourinary: Negative for dysuria and hematuria  Musculoskeletal: Negative for arthralgias, back pain, joint swelling, myalgias and neck pain  Skin: Negative for color change and rash  Neurological: Negative for dizziness, seizures, syncope, weakness, light-headedness, numbness and headaches  All other systems reviewed and are negative        Physical Exam  ED Triage Vitals   Temperature Pulse Respirations Blood Pressure SpO2   08/04/21 1544 08/04/21 1544 08/04/21 1544 08/04/21 1544 08/04/21 1544   98 5 °F (36 9 °C) (!) 114 18 123/78 98 %      Temp Source Heart Rate Source Patient Position - Orthostatic VS BP Location FiO2 (%)   08/04/21 1937 08/04/21 1544 08/04/21 1544 08/04/21 1544 --   Oral Monitor Lying Left arm       Pain Score       08/04/21 1939       No Pain             Orthostatic Vital Signs  Vitals:    08/04/21 1544 08/04/21 1709 08/04/21 1939   BP: 123/78  120/70   Pulse: (!) 114 96 80   Patient Position - Orthostatic VS: Lying  Sitting       Physical Exam  Vitals and nursing note reviewed  Constitutional:       General: She is not in acute distress  Appearance: She is well-developed  HENT:      Head: Normocephalic and atraumatic  Eyes:      Conjunctiva/sclera: Conjunctivae normal    Cardiovascular:      Rate and Rhythm: Normal rate and regular rhythm  Heart sounds: No murmur heard  Pulmonary:      Effort: Pulmonary effort is normal  No respiratory distress  Breath sounds: Normal breath sounds  Abdominal:      General: Abdomen is flat  Bowel sounds are increased  Palpations: Abdomen is soft  Tenderness: There is no abdominal tenderness  There is no guarding or rebound  Hernia: No hernia is present  Musculoskeletal:      Cervical back: Neck supple  Skin:     General: Skin is warm and dry  Capillary Refill: Capillary refill takes less than 2 seconds  Neurological:      General: No focal deficit present  Mental Status: She is alert  Psychiatric:         Mood and Affect: Mood normal          ED Medications  Medications   cephalexin (KEFLEX) capsule 500 mg (500 mg Oral Given 8/4/21 1939)       Diagnostic Studies  Results Reviewed     Procedure Component Value Units Date/Time    Urine Microscopic [[id number]]  (Abnormal) Collected: 08/04/21 1653    Lab Status: Final result Specimen: Urine, Clean Catch Updated: 08/04/21 1859     RBC, UA 4-10 /hpf      WBC, UA 30-50 /hpf      Epithelial Cells Occasional /hpf      Bacteria, UA Moderate /hpf     Urine culture [[id number]] Collected: 08/04/21 1653    Lab Status:  In process Specimen: Urine, Clean Catch Updated: 08/04/21 1859    UA w Reflex to Microscopic w Reflex to Culture [[id number]]  (Abnormal) Collected: 08/04/21 1653    Lab Status: Final result Specimen: Urine, Clean Catch Updated: 08/04/21 1755     Color, UA Yellow     Clarity, UA Clear     Specific [place], UA 1 020     pH, UA 7 5     Leukocytes, UA Moderate     Nitrite, UA Negative     Protein, UA Negative mg/dl      Glucose, UA Negative mg/dl      Ketones, UA Negative mg/dl      Urobilinogen, UA 0 2 E U /dl      Bilirubin, UA Negative     Blood, UA Negative    POCT pregnancy, urine [[id number]]  (Normal) Resulted: 08/04/21 1657    Lab Status: Final result Updated: 08/04/21 1657     EXT PREG TEST UR (Ref: Negative) negative     Control valid    Lipase [[id number]]  (Normal) Collected: 08/04/21 1547    Lab Status: Final result Specimen: Blood from Arm, Right Updated: 08/04/21 1643     Lipase 138 u/L     Troponin I [[id number]]  (Normal) Collected: 08/04/21 1547    Lab Status: Final result Specimen: Blood from Arm, Right Updated: 08/04/21 1621     Troponin I <0 02 ng/mL     Comprehensive metabolic panel [[id number]]  (Abnormal) Collected: 08/04/21 1547    Lab Status: Final result Specimen: Blood from Arm, Right Updated: 08/04/21 1618     Sodium 140 mmol/L      Potassium 3 9 mmol/L      Chloride 104 mmol/L      CO2 28 mmol/L      ANION GAP 8 mmol/L      BUN 11 mg/dL      Creatinine 1 04 mg/dL      Glucose 98 mg/dL      Calcium 9 5 mg/dL      AST 16 U/L      ALT 26 U/L      Alkaline Phosphatase 81 U/L      Total Protein 8 6 g/dL      Albumin 4 6 g/dL      Total Bilirubin 0 32 mg/dL      eGFR 76 ml/min/1 73sq m     Narrative:      [place] guidelines for Chronic Kidney Disease (CKD):     Stage 1 with normal or high GFR (GFR > 90 mL/min/1 73 square meters)    Stage 2 Mild CKD (GFR = 60-89 mL/min/1 73 square meters)    Stage 3A Moderate CKD (GFR = 45-59 mL/min/1 73 square meters)    Stage 3B Moderate CKD (GFR = 30-44 mL/min/1 73 square meters)    Stage 4 Severe CKD (GFR = 15-29 mL/min/1 73 square meters)    Stage 5 End Stage CKD (GFR <15 mL/min/1 73 square meters)  Note: GFR calculation is accurate only with a steady state creatinine    CBC and differential [[id number]]  (Abnormal) Collected: 08/04/21 1547    Lab Status: Final result Specimen: Blood from Arm, Right Updated: 08/04/21 1603     WBC 6 82 Thousand/uL      RBC 5 04 Million/uL      Hemoglobin 16 1 g/dL      Hematocrit 43 0 %      MCV 85 fL      MCH 31 9 pg      MCHC 37 4 g/dL      RDW 13 2 %      MPV 9 3 fL      Platelets 158 Thousands/uL      nRBC 0 /100 WBCs      Neutrophils Relative 56 %      Immat GRANS % 0 %      Lymphocytes Relative 33 %      Monocytes Relative 9 %      Eosinophils Relative 1 %      Basophils Relative 1 %      Neutrophils Absolute 3 88 Thousands/µL      Immature Grans Absolute 0 02 Thousand/uL      Lymphocytes Absolute 2 23 Thousands/µL      Monocytes Absolute 0 58 Thousand/µL      Eosinophils Absolute 0 07 Thousand/µL      Basophils Absolute 0 04 Thousands/µL                  No orders to display         Procedures  Procedures      ED Course                             SBIRT 22yo+      Most Recent Value   SBIRT (24 yo +)   In order to provide better care to our patients, we are screening all of our patients for alcohol and drug use  Would it be okay to ask you these screening questions? Unable to answer at this time Filed at: 08/04/2021 1903                [facility]  Number of Diagnoses or Management Options  Acute UTI  Generalized abdominal pain  Diagnosis management comments: Felecia Jeffries comes to the ED today after persistent, episodic crampy pain in the setting of intense social stress and recent decline in diet quality  Her initial lab work was drawn:  CBC Unremarkable  CMP Unremarkable  Troponin negative  Lipase WNL  Urine - showed moderate leukocytes, Reflex Microscopy showed the presence of bacteria supporting the presence of a UTI      With her unremarkable physical exam, unremarkable lab values, and duration of symptoms, abdominal imaging was deferred at this time  Due to the persistence of her symptoms, it was decided to prescribe a course of Pepcid and keflex for her symptoms  She was provided a note for work today  Return precuations were described to the patient and ws given instructions regarding her prescriptions  DISPO: D/C to home with a course of antibiotics and Pepcid  Amount and/or Complexity of Data Reviewed  Clinical lab tests: ordered and reviewed  Review and summarize past medical records: yes  Discuss the patient with other providers: yes  Independent visualization of images, tracings, or specimens: yes        Disposition  Final diagnoses:   Acute UTI   Generalized abdominal pain     Time reflects when diagnosis was documented in both MDM as applicable and the Disposition within this note     Time User Action Codes Description Comment    8/4/2021  7:11 PM Abigail Kya [N39 0] Acute UTI     8/4/2021  7:11 PM Annabellesamvenice Yolanda Raghav [R10 [id number]] Generalized abdominal pain       ED Disposition     ED Disposition Condition Date/Time Comment    Discharge Stable Wed Aug 4, 2021  7:11 PM Indira Castrejon discharge to home/self care              Follow-up Information     Follow up With Specialties Details Why Contact Info Additional Information    Emily Horn,  Family Medicine In 3 days  [address] [place] [address]       [facility] days  [address] [place] [address]   [phone] [facility], [address] [place] [address], [id number] [phone]          Discharge Medication List as of 8/4/2021  7:12 PM      START taking these medications    Details   cephalexin (KEFLEX) 500 mg capsule Take 1 capsule (500 mg total) by mouth every 12 (twelve) hours for 7 days, Starting Wed 8/4/2021, Until Wed 8/11/2021, Normal      famotidine (PEPCID) 20 mg tablet Take 1 tablet (20 mg total) by mouth 2 (two) times a day for 10 days, Starting Wed 8/4/2021, Until Sat 8/14/2021, Normal         CONTINUE these medications which have NOT CHANGED    Details   hydrOXYzine pamoate (VISTARIL) 25 mg capsule Take 1 capsule (25 mg total) by mouth daily at bedtime, Starting Sun 9/6/2020, Until Tue 10/6/2020, Normal      venlafaxine (EFFEXOR-XR) 75 mg 24 hr capsule Take 1 capsule (75 mg total) by mouth daily, Starting Sun 9/6/2020, Until Tue 10/6/2020, Normal           No discharge procedures on file  PDMP Review     None           ED Provider  Attending physically available and evaluated Dary Current  I managed the patient along with the ED Attending      Electronically Signed by         Janell Gonzalez MD  08/04/21 2056

## 2021-08-04 NOTE — Clinical Note
Matt Purchase was seen and treated in our emergency department on 8/4/2021  Diagnosis:     Gregor Walker  may return to work on return date  She may return on this date: 08/05/2021    Kingston Purchase was seen in our ED today (8/4/2021)  If you have any questions or concerns, please don't hesitate to call        Aspen Conteh MD    ______________________________           _______________          _______________  [facility] Representative                              Date                                Time

## 2021-08-07 LAB
BACTERIA UR CULT: ABNORMAL
BACTERIA UR CULT: ABNORMAL

## 2021-09-08 ENCOUNTER — HOSPITAL ENCOUNTER (EMERGENCY)
Facility: HOSPITAL | Age: 21
Discharge: HOME | End: 2021-09-08
Attending: EMERGENCY MEDICINE
Payer: COMMERCIAL

## 2021-09-08 VITALS
RESPIRATION RATE: 18 BRPM | HEART RATE: 100 BPM | TEMPERATURE: 99 F | DIASTOLIC BLOOD PRESSURE: 102 MMHG | SYSTOLIC BLOOD PRESSURE: 142 MMHG | OXYGEN SATURATION: 100 %

## 2021-09-08 DIAGNOSIS — R20.2 PARESTHESIA: ICD-10-CM

## 2021-09-08 DIAGNOSIS — Z51.89 VISIT FOR WOUND CHECK: ICD-10-CM

## 2021-09-08 DIAGNOSIS — Z20.822 ENCOUNTER FOR SCREENING LABORATORY TESTING FOR COVID-19 VIRUS: Primary | ICD-10-CM

## 2021-09-08 LAB — SARS-COV-2 RNA RESP QL NAA+PROBE: NEGATIVE

## 2021-09-08 PROCEDURE — 99283 EMERGENCY DEPT VISIT LOW MDM: CPT

## 2021-09-08 PROCEDURE — U0003 INFECTIOUS AGENT DETECTION BY NUCLEIC ACID (DNA OR RNA); SEVERE ACUTE RESPIRATORY SYNDROME CORONAVIRUS 2 (SARS-COV-2) (CORONAVIRUS DISEASE [COVID-19]), AMPLIFIED PROBE TECHNIQUE, MAKING USE OF HIGH THROUGHPUT TECHNOLOGIES AS DESCRIBED BY CMS-2020-01-R: HCPCS | Performed by: EMERGENCY MEDICINE

## 2021-09-08 PROCEDURE — 99282 EMERGENCY DEPT VISIT SF MDM: CPT | Performed by: EMERGENCY MEDICINE

## 2021-09-08 PROCEDURE — U0005 INFEC AGEN DETEC AMPLI PROBE: HCPCS | Performed by: EMERGENCY MEDICINE

## 2022-03-20 ENCOUNTER — HOSPITAL ENCOUNTER (EMERGENCY)
Facility: HOSPITAL | Age: 22
Discharge: HOME | End: 2022-03-20
Attending: EMERGENCY MEDICINE | Admitting: EMERGENCY MEDICINE
Payer: MEDICARE

## 2022-03-20 VITALS
OXYGEN SATURATION: 99 % | BODY MASS INDEX: 25.76 KG/M2 | HEART RATE: 89 BPM | HEIGHT: 62 IN | SYSTOLIC BLOOD PRESSURE: 134 MMHG | RESPIRATION RATE: 18 BRPM | TEMPERATURE: 98.2 F | WEIGHT: 140 LBS | DIASTOLIC BLOOD PRESSURE: 82 MMHG

## 2022-03-20 DIAGNOSIS — N39.0 UTI (URINARY TRACT INFECTION): Primary | ICD-10-CM

## 2022-03-20 LAB
BACTERIA UR QL AUTO: ABNORMAL /HPF
BILIRUB UR QL STRIP: NEGATIVE
CLARITY UR: ABNORMAL
COLOR UR: YELLOW
EXT PREG TEST URINE: NEGATIVE
EXT. CONTROL ED NAV: NORMAL
GLUCOSE UR STRIP-MCNC: NEGATIVE MG/DL
HGB UR QL STRIP.AUTO: ABNORMAL
KETONES UR STRIP-MCNC: NEGATIVE MG/DL
LEUKOCYTE ESTERASE UR QL STRIP: ABNORMAL
NITRITE UR QL STRIP: NEGATIVE
NON-SQ EPI CELLS URNS QL MICRO: ABNORMAL /HPF
PH UR STRIP.AUTO: 5.5 [PH]
PROT UR STRIP-MCNC: NEGATIVE MG/DL
RBC #/AREA URNS AUTO: ABNORMAL /HPF
SP GR UR STRIP.AUTO: 1.02 (ref 1–1.03)
UROBILINOGEN UR QL STRIP.AUTO: 0.2 E.U./DL
WBC #/AREA URNS AUTO: ABNORMAL /HPF

## 2022-03-20 PROCEDURE — 81003 URINALYSIS AUTO W/O SCOPE: CPT | Performed by: PHYSICIAN ASSISTANT

## 2022-03-20 PROCEDURE — 87086 URINE CULTURE/COLONY COUNT: CPT | Performed by: PHYSICIAN ASSISTANT

## 2022-03-20 PROCEDURE — 81001 URINALYSIS AUTO W/SCOPE: CPT | Performed by: PHYSICIAN ASSISTANT

## 2022-03-20 PROCEDURE — 99283 EMERGENCY DEPT VISIT LOW MDM: CPT

## 2022-03-20 PROCEDURE — 81025 URINE PREGNANCY TEST: CPT | Performed by: PHYSICIAN ASSISTANT

## 2022-03-20 PROCEDURE — 99284 EMERGENCY DEPT VISIT MOD MDM: CPT | Performed by: PHYSICIAN ASSISTANT

## 2022-03-20 RX ORDER — NITROFURANTOIN 25; 75 MG/1; MG/1
100 CAPSULE ORAL ONCE
Status: COMPLETED | OUTPATIENT
Start: 2022-03-20 | End: 2022-03-20

## 2022-03-20 RX ORDER — NITROFURANTOIN 25; 75 MG/1; MG/1
100 CAPSULE ORAL 2 TIMES DAILY
Qty: 10 CAPSULE | Refills: 0 | Status: SHIPPED | OUTPATIENT
Start: 2022-03-20

## 2022-03-20 RX ADMIN — NITROFURANTOIN (MONOHYDRATE/MACROCRYSTALS) 100 MG: 75; 25 CAPSULE ORAL at 21:18

## 2022-03-21 NOTE — DISCHARGE INSTRUCTIONS
Please return to the emergency department for worsening symptoms including chest pain, shortness of breath, dizziness, lightheadedness, fever greater than 103, severe pain, inability to walk, fainting episodes, etc  Please follow-up with your family practice provider as soon as possible  I have sent antibiotics over to your pharmacy  Please take the entire course of these antibiotics  Please take them as prescribed

## 2022-03-22 LAB — BACTERIA UR CULT: NORMAL

## 2022-03-23 NOTE — ED PROVIDER NOTES
History  Chief Complaint   Patient presents with    Possible UTI     pt with increased urination and burning x 1 week     This is a 78-year-old female with past medical history significant for urinary tract infections presenting to the emergency department today for urinary urgency and dysuria for approximately 10 days  She attempted over-the-counter cranberry supplements but notes that her symptoms are worsening  She denies any hematuria or history of nephrolithiasis  She notes it feels very similar to her prior urinary tract infections  No abnormal vaginal discharge or vaginal bleeding  She is uncertain when her last menstrual period was  She denies any suprapubic or flank pain  No nausea, vomiting, diarrhea, or constipation  The patient denies possibility of sexually transmitted infection  The patient denies other complaints at this time  History provided by:  Patient   used: No    Difficulty Urinating  Pain quality:  Burning  Pain severity:  Moderate  Onset quality:  Gradual  Duration:  10 days  Timing:  Intermittent  Progression:  Worsening  Chronicity:  New  Recent urinary tract infections: no    Relieved by:  Nothing  Ineffective treatments:  None tried  Urinary symptoms: frequent urination    Urinary symptoms: no discolored urine, no foul-smelling urine, no hematuria, no hesitancy and no bladder incontinence    Associated symptoms: no abdominal pain, no fever, no flank pain, no genital lesions, no nausea, no vaginal discharge and no vomiting    Risk factors: recurrent urinary tract infections    Risk factors: no hx of pyelonephritis, no hx of urolithiasis, not pregnant, not sexually active and no urinary catheter        Prior to Admission Medications   Prescriptions Last Dose Informant Patient Reported?  Taking?   famotidine (PEPCID) 20 mg tablet   No No   Sig: Take 1 tablet (20 mg total) by mouth 2 (two) times a day for 10 days   hydrOXYzine pamoate (VISTARIL) 25 mg capsule No No   Sig: Take 1 capsule (25 mg total) by mouth daily at bedtime   venlafaxine (EFFEXOR-XR) 75 mg 24 hr capsule   No No   Sig: Take 1 capsule (75 mg total) by mouth daily      Facility-Administered Medications: None       Past Medical History:   Diagnosis Date    Anxiety     Depression     Migraines        History reviewed  No pertinent surgical history  History reviewed  No pertinent family history  I have reviewed and agree with the history as documented  E-Cigarette/Vaping    E-Cigarette Use Never User      E-Cigarette/Vaping Substances     Social History     Tobacco Use    Smoking status: Never Smoker    Smokeless tobacco: Never Used   Vaping Use    Vaping Use: Never used   Substance Use Topics    Alcohol use: Not Currently    Drug use: Not Currently       Review of Systems   Constitutional: Negative for appetite change, chills, fatigue and fever  Eyes: Negative for visual disturbance  Respiratory: Negative for cough, chest tightness, shortness of breath and wheezing  Cardiovascular: Negative for chest pain and palpitations  Gastrointestinal: Negative for abdominal pain, constipation, diarrhea, nausea and vomiting  Genitourinary: Positive for dysuria, frequency and urgency  Negative for decreased urine volume, flank pain, vaginal bleeding, vaginal discharge and vaginal pain  Musculoskeletal: Negative for neck pain and neck stiffness  Skin: Negative for rash and wound  Neurological: Negative for dizziness, seizures, syncope, weakness, light-headedness, numbness and headaches  Psychiatric/Behavioral: Negative for confusion  All other systems reviewed and are negative  Physical Exam  Physical Exam  Vitals and nursing note reviewed  Constitutional:       General: She is not in acute distress  Appearance: Normal appearance  She is normal weight  She is not ill-appearing, toxic-appearing or diaphoretic  HENT:      Head: Normocephalic and atraumatic        Nose: Nose normal  No congestion or rhinorrhea  Mouth/Throat:      Mouth: Mucous membranes are moist       Pharynx: No oropharyngeal exudate or posterior oropharyngeal erythema  Eyes:      General: No scleral icterus  Right eye: No discharge  Left eye: No discharge  Extraocular Movements: Extraocular movements intact  Pupils: Pupils are equal, round, and reactive to light  Cardiovascular:      Rate and Rhythm: Normal rate and regular rhythm  Pulses: Normal pulses  Heart sounds: Normal heart sounds  No murmur heard  No friction rub  No gallop  Pulmonary:      Effort: Pulmonary effort is normal  No respiratory distress  Breath sounds: Normal breath sounds  No stridor  No wheezing, rhonchi or rales  Chest:      Chest wall: No tenderness  Abdominal:      General: Abdomen is flat  There is no distension  Palpations: Abdomen is soft  Tenderness: There is no abdominal tenderness  There is no right CVA tenderness, left CVA tenderness, guarding or rebound  Musculoskeletal:         General: Normal range of motion  Cervical back: Normal range of motion  No tenderness  Right lower leg: No edema  Left lower leg: No edema  Skin:     General: Skin is warm and dry  Capillary Refill: Capillary refill takes less than 2 seconds  Coloration: Skin is not jaundiced or pale  Neurological:      General: No focal deficit present  Mental Status: She is alert and oriented to person, place, and time  Mental status is at baseline     Psychiatric:         Mood and Affect: Mood normal          Behavior: Behavior normal          Vital Signs  ED Triage Vitals [03/20/22 2024]   Temperature Pulse Respirations Blood Pressure SpO2   98 2 °F (36 8 °C) 89 18 134/82 99 %      Temp Source Heart Rate Source Patient Position - Orthostatic VS BP Location FiO2 (%)   Oral -- Sitting Left arm --      Pain Score       --           Vitals:    03/20/22 2024   BP: 134/82 Pulse: 89   Patient Position - Orthostatic VS: Sitting         Visual Acuity      ED Medications  Medications   nitrofurantoin (MACROBID) extended-release capsule 100 mg (100 mg Oral Given 3/20/22 2118)       Diagnostic Studies  Results Reviewed     Procedure Component Value Units Date/Time    Urine culture [[id number]] Collected: 03/20/22 2033    Lab Status: Final result Specimen: Urine, Clean Catch Updated: 03/22/22 2134     Urine Culture >100,000 cfu/ml     Urine Microscopic [[id number]]  (Abnormal) Collected: 03/20/22 2033    Lab Status: Final result Specimen: Urine, Clean Catch Updated: 03/20/22 2103     RBC, UA 30-50 /hpf      WBC, UA 30-50 /hpf      Epithelial Cells Occasional /hpf      Bacteria, UA Moderate /hpf     UA w Reflex to Microscopic w Reflex to Culture [[id number]]  (Abnormal) Collected: 03/20/22 2033    Lab Status: Final result Specimen: Urine, Clean Catch Updated: 03/20/22 2051     Color, UA Yellow     Clarity, UA Cloudy     Specific Gravity, UA 1 025     pH, UA 5 5     Leukocytes, UA 1+     Nitrite, UA Negative     Protein, UA Negative mg/dl      Glucose, UA Negative mg/dl      Ketones, UA Negative mg/dl      Urobilinogen, UA 0 2 E U /dl      Bilirubin, UA Negative     Blood, UA 1+    POCT pregnancy, urine [[id number]]  (Normal) Resulted: 03/20/22 2037    Lab Status: Final result Updated: 03/20/22 2038     EXT PREG TEST UR (Ref: Negative) negative     Control valid                 No orders to display              Procedures  Procedures         ED Course                                             MDM  Number of Diagnoses or Management Options  UTI (urinary tract infection): new and requires workup  Diagnosis management comments: This is a 25-year-old female presenting to the emergency department today for urgency, dysuria, and frequency  History of the same  Notes this feels very similar to her prior urinary tract infections  No nephrolithiasis or hematuria    Patient denies any possibility of sexually transmitted infections  Vital signs are stable  Physical exam is within normal limits  Patient is not pregnant  Urinary tract infection on UA  The patient is stable for discharge at this time  Macrobid sent to the patient's pharmacy  Strict return precautions were given  Recommend PCP follow-up as soon as possible  The patient verifies understanding and agrees to the plan at this time  All questions answered to the patient's satisfaction  Amount and/or Complexity of Data Reviewed  Clinical lab tests: ordered and reviewed  Review and summarize past medical records: yes        Disposition  Final diagnoses:   UTI (urinary tract infection)     Time reflects when diagnosis was documented in both MDM as applicable and the Disposition within this note     Time User Action Codes Description Comment    3/20/2022  9:14 PM Des Martinez Add [N39 0] UTI (urinary tract infection)       ED Disposition     ED Disposition Condition Date/Time Comment    Discharge Stable Sun Mar 20, 2022  9:14 PM Roxy Monroe discharge to home/self care  Follow-up Information    None         Discharge Medication List as of 3/20/2022  9:16 PM      START taking these medications    Details   nitrofurantoin (MACROBID) 100 mg capsule Take 1 capsule (100 mg total) by mouth 2 (two) times a day, Starting Sun 3/20/2022, Normal         CONTINUE these medications which have NOT CHANGED    Details   famotidine (PEPCID) 20 mg tablet Take 1 tablet (20 mg total) by mouth 2 (two) times a day for 10 days, Starting Wed 8/4/2021, Until Sat 8/14/2021, Normal      hydrOXYzine pamoate (VISTARIL) 25 mg capsule Take 1 capsule (25 mg total) by mouth daily at bedtime, Starting Sun 9/6/2020, Until Tue 10/6/2020, Normal      venlafaxine (EFFEXOR-XR) 75 mg 24 hr capsule Take 1 capsule (75 mg total) by mouth daily, Starting Sun 9/6/2020, Until Tue 10/6/2020, Normal             No discharge procedures on file      PDMP Review     None ED Provider  Electronically Signed by           Luis E Saavedra PA-C  03/23/22 1033 [place] Beba Nguyen PA-C  03/23/22 8605

## 2022-10-17 ENCOUNTER — HOSPITAL ENCOUNTER (EMERGENCY)
Facility: HOSPITAL | Age: 22
Discharge: HOME | End: 2022-10-17
Attending: EMERGENCY MEDICINE
Payer: MEDICARE

## 2022-10-17 VITALS
RESPIRATION RATE: 20 BRPM | HEIGHT: 63 IN | WEIGHT: 143 LBS | TEMPERATURE: 97.8 F | DIASTOLIC BLOOD PRESSURE: 77 MMHG | OXYGEN SATURATION: 98 % | BODY MASS INDEX: 25.34 KG/M2 | SYSTOLIC BLOOD PRESSURE: 142 MMHG | HEART RATE: 72 BPM

## 2022-10-17 DIAGNOSIS — G43.909 MIGRAINE WITHOUT STATUS MIGRAINOSUS, NOT INTRACTABLE, UNSPECIFIED MIGRAINE TYPE: Primary | ICD-10-CM

## 2022-10-17 LAB
FLUAV RNA RESP QL NAA+PROBE: NEGATIVE
FLUBV RNA RESP QL NAA+PROBE: NEGATIVE
RSV RNA RESP QL NAA+PROBE: NEGATIVE
SARS-COV-2 RNA RESP QL NAA+PROBE: NEGATIVE

## 2022-10-17 PROCEDURE — 99283 EMERGENCY DEPT VISIT LOW MDM: CPT

## 2022-10-17 PROCEDURE — 99284 EMERGENCY DEPT VISIT MOD MDM: CPT | Performed by: EMERGENCY MEDICINE

## 2022-10-17 PROCEDURE — 0241U HB NFCT DS VIR RESP RNA 4 TRGT: CPT | Performed by: EMERGENCY MEDICINE

## 2022-10-17 RX ORDER — METOCLOPRAMIDE 10 MG/1
10 TABLET ORAL EVERY 6 HOURS
Qty: 30 TABLET | Refills: 0 | Status: SHIPPED | OUTPATIENT
Start: 2022-10-17

## 2022-10-17 RX ORDER — ACETAMINOPHEN 325 MG/1
650 TABLET ORAL ONCE
Status: COMPLETED | OUTPATIENT
Start: 2022-10-17 | End: 2022-10-17

## 2022-10-17 RX ORDER — METOCLOPRAMIDE 10 MG/1
10 TABLET ORAL ONCE
Status: COMPLETED | OUTPATIENT
Start: 2022-10-17 | End: 2022-10-17

## 2022-10-17 RX ORDER — IBUPROFEN 600 MG/1
600 TABLET ORAL ONCE
Status: COMPLETED | OUTPATIENT
Start: 2022-10-17 | End: 2022-10-17

## 2022-10-17 RX ORDER — NAPROXEN 500 MG/1
500 TABLET ORAL 2 TIMES DAILY WITH MEALS
Qty: 30 TABLET | Refills: 0 | Status: SHIPPED | OUTPATIENT
Start: 2022-10-17

## 2022-10-17 RX ADMIN — IBUPROFEN 600 MG: 600 TABLET, FILM COATED ORAL at 21:29

## 2022-10-17 RX ADMIN — ACETAMINOPHEN 650 MG: 325 TABLET ORAL at 21:29

## 2022-10-17 RX ADMIN — METOCLOPRAMIDE 10 MG: 10 TABLET ORAL at 21:29

## 2022-10-17 NOTE — Clinical Note
Roger Dye was seen and treated in our emergency department on 10/17/2022  Diagnosis:     Lali    She may return on this date: 10/19/2022         If you have any questions or concerns, please don't hesitate to call        Luma Kang DO    ______________________________           _______________          _______________  Hospital Representative                              Date                                Time

## 2022-10-18 NOTE — ED PROVIDER NOTES
History  Chief Complaint   Patient presents with   • Headache     Pt reports migraine x 2 days; reports feeling warm at home but unsure of fever     Sunil Lockhart is an 23y o  year old female with PMHx significant for anxiety, depression, migraines, who presents to the ED today with HA, cough, rhinorrhea, sore throat, nausea for a few days  HA is similar to prior headaches though she states that it does not usually accompanied by nausea or upper respiratory symptoms  She does have a history of migraines and has had to come to the emergency department a few times in the past for headaches but only once required IV rescue medication  The pain is aching in quality, does not radiate, and currently rated severe in severity  Pain is right-sided  Has known sick contacts with similar respiratory symptoms  No lightheadedness or syncope, vision changes, hearing changes, chest pain, shortness of breath, abdominal pain, changes in usual bowel movements, changes with urination,  New pain anywhere else in body  ROS otherwise negative  History provided by:  Medical records and patient   used: No    Headache      Prior to Admission Medications   Prescriptions Last Dose Informant Patient Reported? Taking?   famotidine (PEPCID) 20 mg tablet   No No   Sig: Take 1 tablet (20 mg total) by mouth 2 (two) times a day for 10 days   hydrOXYzine pamoate (VISTARIL) 25 mg capsule   No No   Sig: Take 1 capsule (25 mg total) by mouth daily at bedtime   nitrofurantoin (MACROBID) 100 mg capsule   No No   Sig: Take 1 capsule (100 mg total) by mouth 2 (two) times a day   venlafaxine (EFFEXOR-XR) 75 mg 24 hr capsule   No No   Sig: Take 1 capsule (75 mg total) by mouth daily      Facility-Administered Medications: None       Past Medical History:   Diagnosis Date   • Anxiety    • Depression    • Migraines        History reviewed  No pertinent surgical history  History reviewed  No pertinent family history    I have reviewed and agree with the history as documented  E-Cigarette/Vaping   • E-Cigarette Use Never User      E-Cigarette/Vaping Substances     Social History     Tobacco Use   • Smoking status: Never Smoker   • Smokeless tobacco: Never Used   Vaping Use   • Vaping Use: Never used   Substance Use Topics   • Alcohol use: Not Currently   • Drug use: Not Currently       Review of Systems   Reason unable to perform ROS: please see above for ROS  All other ROS negative  Neurological: Positive for headaches  Physical Exam  Physical Exam  Vitals and nursing note reviewed  Constitutional:       General: She is not in acute distress  Appearance: She is well-developed  She is not diaphoretic  HENT:      Head: Normocephalic and atraumatic  Eyes:      General: No scleral icterus  Conjunctiva/sclera: Conjunctivae normal    Neck:      Vascular: No JVD  Trachea: No tracheal deviation  Cardiovascular:      Rate and Rhythm: Normal rate and regular rhythm  Pulmonary:      Effort: Pulmonary effort is normal  No respiratory distress  Abdominal:      General: There is no distension  Musculoskeletal:         General: No deformity  Cervical back: Neck supple  Skin:     General: Skin is warm and dry  Neurological:      Mental Status: She is alert  Comments: Strength 5/5 and sensation intact to light touch in all extremities  PERRL  Cranial nerves 2-12 grossly intact  Normal speech and gait     Psychiatric:         Behavior: Behavior normal          Vital Signs  ED Triage Vitals   Temperature Pulse Respirations Blood Pressure SpO2   10/17/22 2056 10/17/22 2055 10/17/22 2055 10/17/22 2055 10/17/22 2055   97 8 °F (36 6 °C) 72 20 142/77 98 %      Temp Source Heart Rate Source Patient Position - Orthostatic VS BP Location FiO2 (%)   10/17/22 2056 10/17/22 2055 10/17/22 2055 10/17/22 2055 --   Oral Monitor Sitting Right arm       Pain Score       10/17/22 2055       1           Vitals: 10/17/22 2055   BP: 142/77   Pulse: 72   Patient Position - Orthostatic VS: Sitting         Visual Acuity      ED Medications  Medications   acetaminophen (TYLENOL) tablet 650 mg (650 mg Oral Given 10/17/22 2129)   ibuprofen (MOTRIN) tablet 600 mg (600 mg Oral Given 10/17/22 2129)   metoclopramide (REGLAN) tablet 10 mg (10 mg Oral Given 10/17/22 2129)       Diagnostic Studies  Results Reviewed     Procedure Component Value Units Date/Time    FLU/RSV/COVID - if FLU/RSV clinically relevant [[id number]]  (Normal) Collected: 10/17/22 2127    Lab Status: Final result Specimen: Nares from Nasopharyngeal Swab Updated: 10/17/22 2218     SARS-CoV-2 Negative     INFLUENZA A PCR Negative     INFLUENZA B PCR Negative     RSV PCR Negative    Narrative:      FOR PEDIATRIC PATIENTS - copy/paste COVID Guidelines URL to browser: [email or web address]    SARS-CoV-2 assay is a Nucleic Acid Amplification assay intended for the  qualitative detection of nucleic acid from SARS-CoV-2 in nasopharyngeal  swabs  Results are for the presumptive identification of SARS-CoV-2 RNA  Positive results are indicative of infection with SARS-CoV-2, the virus  causing COVID-19, but do not rule out bacterial infection or co-infection  with other viruses  Laboratories within the [place] and its  territories are required to report all positive results to the appropriate  public health authorities  Negative results do not preclude SARS-CoV-2  infection and should not be used as the sole basis for treatment or other  patient management decisions  Negative results must be combined with  clinical observations, patient history, and epidemiological information  This test has not been FDA cleared or approved  This test has been authorized by FDA under an Emergency Use Authorization  (EUA)   This test is only authorized for the duration of time the  declaration that circumstances exist justifying the authorization of the  emergency use of an in vitro diagnostic tests for detection of SARS-CoV-2  virus and/or diagnosis of COVID-19 infection under section 564(b)(1) of  the Act, 21 U  S C  949PSV-6(H)(8), unless the authorization is terminated  or revoked sooner  The test has been validated but independent review by FDA  and CLIA is pending  Test performed using [facility] GeneXpert: This RT-PCR assay targets N2,  a region unique to SARS-CoV-2  A conserved region in the E-gene was chosen  for pan-Sarbecovirus detection which includes SARS-CoV-2  According to CMS-2020-01-R, this platform meets the definition of high-throughput technology  No orders to display              Procedures  Procedures         ED Course                                             MDM  Number of Diagnoses or Management Options  Migraine without status migrainosus, not intractable, unspecified migraine type  Diagnosis management comments: No altered mental status, syncope, trauma, focal neurologic deficits nuchal rigidity, visual disturbance, temporal tenderness  History and exam not suspect for ingestion or environmental exposure  No  malignancy or immunocompromise  No bleeding tendency, clotting disorders  Patient does have a history of migraines, with usual symptoms consisting of similar HA symptoms that she has today  Vital signs wnl  Patient tolerating PO and ambulating without difficulty  Patient declines IV or IM rescue medications at this time, feels the symptoms are not severe enough to warrant it         Amount and/or Complexity of Data Reviewed  Review and summarize past medical records: yes    Patient Progress  Patient progress: stable      Disposition  Final diagnoses:   Migraine without status migrainosus, not intractable, unspecified migraine type     Time reflects when diagnosis was documented in both MDM as applicable and the Disposition within this note     Time User Action Codes Description Comment    10/17/2022  9:13 PM [facility] Add [G43 909] Migraine without status migrainosus, not intractable, unspecified migraine type       ED Disposition     ED Disposition   Discharge    Condition   Stable    Date/Time   Mon Oct 17, 2022  9:13 PM    Comment   Radha Mcclain discharge to home/self care  Results of completed tests discussed  Return to ER precautions given, verbal and written, and questions answered satisfactorily                 Follow-up Information     Follow up With Specialties Details Why Contact Info Additional Information    Nels Peabody, DO Family Medicine Call in 1 day To recheck symptoms and follow up on your ER visit [address] [facility] 20 [facility] 2420 [facility] Neurology [facility] Neurology Call in 1 day For specialty evaluation and/or treatment [address]  121 [facility] Neurology [facility], [address], [place], [place], 300 [facility] Neurology [address] [place] [address] Neurology Call in 1 day For specialty evaluation and/or treatment [address], [address] 1950 [facility] Neurology 5900 [facility], 2390 [facility], [address] [place] [address], 5680 [facility]          Discharge Medication List as of 10/17/2022  9:16 PM      START taking these medications    Details   metoclopramide (Reglan) 10 mg tablet Take 1 tablet (10 mg total) by mouth every 6 (six) hours, Starting Mon 10/17/2022, Normal      naproxen (Naprosyn) 500 mg tablet Take 1 tablet (500 mg total) by mouth 2 (two) times a day with meals, Starting Mon 10/17/2022, Normal         CONTINUE these medications which have NOT CHANGED    Details   famotidine (PEPCID) 20 mg tablet Take 1 tablet (20 mg total) by mouth 2 (two) times a day for 10 days, Starting Wed 8/4/2021, Until Sat 8/14/2021, Normal      hydrOXYzine pamoate (VISTARIL) 25 mg capsule Take 1 capsule (25 mg total) by mouth daily at bedtime, Starting Sun 9/6/2020, Until Tue 10/6/2020, Normal      nitrofurantoin (MACROBID) 100 mg capsule Take 1 capsule (100 mg total) by mouth 2 (two) times a day, Starting Sun 3/20/2022, Normal      venlafaxine (EFFEXOR-XR) 75 mg 24 hr capsule Take 1 capsule (75 mg total) by mouth daily, Starting Sun 9/6/2020, Until Tue 10/6/2020, Normal                 PDMP Review     None          ED Provider  Electronically Signed by           Karey Pryor DO  10/17/22 6098

## 2022-11-16 ENCOUNTER — TELEPHONE (OUTPATIENT)
Dept: NEUROLOGY | Facility: CLINIC | Age: 22
End: 2022-11-16

## 2023-08-11 ENCOUNTER — HOSPITAL ENCOUNTER (EMERGENCY)
Facility: HOSPITAL | Age: 23
Discharge: HOME | End: 2023-08-11
Attending: EMERGENCY MEDICINE
Payer: MEDICARE

## 2023-08-11 VITALS
RESPIRATION RATE: 18 BRPM | TEMPERATURE: 97.6 F | HEART RATE: 98 BPM | DIASTOLIC BLOOD PRESSURE: 102 MMHG | OXYGEN SATURATION: 98 % | SYSTOLIC BLOOD PRESSURE: 161 MMHG

## 2023-08-11 DIAGNOSIS — R10.2 VAGINAL PAIN: Primary | ICD-10-CM

## 2023-08-11 LAB
BACTERIA UR QL AUTO: NORMAL /HPF
BILIRUB UR QL STRIP: NEGATIVE
CLARITY UR: CLEAR
COLOR UR: COLORLESS
EXT PREGNANCY TEST URINE: NEGATIVE
EXT. CONTROL: NORMAL
GLUCOSE UR STRIP-MCNC: NEGATIVE MG/DL
HGB UR QL STRIP.AUTO: ABNORMAL
KETONES UR STRIP-MCNC: NEGATIVE MG/DL
LEUKOCYTE ESTERASE UR QL STRIP: NEGATIVE
NITRITE UR QL STRIP: NEGATIVE
NON-SQ EPI CELLS URNS QL MICRO: NORMAL /HPF
PH UR STRIP.AUTO: 6.5 [PH]
PROT UR STRIP-MCNC: NEGATIVE MG/DL
RBC #/AREA URNS AUTO: NORMAL /HPF
SP GR UR STRIP.AUTO: 1 (ref 1–1.03)
UROBILINOGEN UR STRIP-ACNC: <2 MG/DL
WBC #/AREA URNS AUTO: NORMAL /HPF

## 2023-08-11 PROCEDURE — 87661 TRICHOMONAS VAGINALIS AMPLIF: CPT | Performed by: EMERGENCY MEDICINE

## 2023-08-11 PROCEDURE — 87591 N.GONORRHOEAE DNA AMP PROB: CPT | Performed by: EMERGENCY MEDICINE

## 2023-08-11 PROCEDURE — 87491 CHLMYD TRACH DNA AMP PROBE: CPT | Performed by: EMERGENCY MEDICINE

## 2023-08-11 PROCEDURE — 87563 M. GENITALIUM AMP PROBE: CPT | Performed by: EMERGENCY MEDICINE

## 2023-08-11 PROCEDURE — 81001 URINALYSIS AUTO W/SCOPE: CPT | Performed by: EMERGENCY MEDICINE

## 2023-08-11 PROCEDURE — 99284 EMERGENCY DEPT VISIT MOD MDM: CPT | Performed by: EMERGENCY MEDICINE

## 2023-08-11 PROCEDURE — 99283 EMERGENCY DEPT VISIT LOW MDM: CPT

## 2023-08-11 PROCEDURE — 81025 URINE PREGNANCY TEST: CPT | Performed by: EMERGENCY MEDICINE

## 2023-08-12 NOTE — ED PROVIDER NOTES
History  Chief Complaint   Patient presents with   • Vaginal Pain     Pt reports vaginal pain since last night after intercourse. Pt reports there is also discharge and some spotting. Vaginal Pain      Prior to Admission Medications   Prescriptions Last Dose Informant Patient Reported? Taking?   famotidine (PEPCID) 20 mg tablet   No No   Sig: Take 1 tablet (20 mg total) by mouth 2 (two) times a day for 10 days   hydrOXYzine pamoate (VISTARIL) 25 mg capsule   No No   Sig: Take 1 capsule (25 mg total) by mouth daily at bedtime   metoclopramide (Reglan) 10 mg tablet   No No   Sig: Take 1 tablet (10 mg total) by mouth every 6 (six) hours   naproxen (Naprosyn) 500 mg tablet   No No   Sig: Take 1 tablet (500 mg total) by mouth 2 (two) times a day with meals   nitrofurantoin (MACROBID) 100 mg capsule   No No   Sig: Take 1 capsule (100 mg total) by mouth 2 (two) times a day   venlafaxine (EFFEXOR-XR) 75 mg 24 hr capsule   No No   Sig: Take 1 capsule (75 mg total) by mouth daily      Facility-Administered Medications: None       Past Medical History:   Diagnosis Date   • Anxiety    • Depression    • Migraines        History reviewed. No pertinent surgical history. History reviewed. No pertinent family history. I have reviewed and agree with the history as documented. E-Cigarette/Vaping   • E-Cigarette Use Never User      E-Cigarette/Vaping Substances     Social History     Tobacco Use   • Smoking status: Never   • Smokeless tobacco: Never   Vaping Use   • Vaping Use: Never used   Substance Use Topics   • Alcohol use: Not Currently   • Drug use: Not Currently       Review of Systems   Genitourinary: Positive for vaginal pain. Physical Exam  Physical Exam  Vitals and nursing note reviewed. Exam conducted with a chaperone present Flakito Cook [facility])). Constitutional:       General: She is in acute distress (mild). Appearance: She is well-developed. HENT:      Head: Normocephalic and atraumatic. Eyes:      Pupils: Pupils are equal, round, and reactive to light. Neck:      Vascular: No JVD. Cardiovascular:      Rate and Rhythm: Normal rate and regular rhythm. Heart sounds: Normal heart sounds. No murmur heard. No friction rub. No gallop. Pulmonary:      Effort: Pulmonary effort is normal. No respiratory distress. Breath sounds: Normal breath sounds. No wheezing or rales. Chest:      Chest wall: No tenderness. Genitourinary:     Exam position: Supine. Labia:         Right: No rash or tenderness. Left: No rash or tenderness. Vagina: No signs of injury. Tenderness (12 o'clock agains pubic symphasis) present. No vaginal discharge, erythema, bleeding or lesions. Cervix: No discharge, friability or erythema. Uterus: Normal.       Adnexa:         Right: No tenderness. Left: No tenderness. Musculoskeletal:         General: No tenderness. Normal range of motion. Cervical back: Normal range of motion. Skin:     General: Skin is warm and dry. Neurological:      General: No focal deficit present. Mental Status: She is alert and oriented to person, place, and time. Psychiatric:         Behavior: Behavior normal.         Thought Content:  Thought content normal.         Judgment: Judgment normal.         Vital Signs  ED Triage Vitals [08/11/23 1915]   Temperature Pulse Respirations Blood Pressure SpO2   97.6 °F (36.4 °C) 98 18 (!) 161/102 98 %      Temp Source Heart Rate Source Patient Position - Orthostatic VS BP Location FiO2 (%)   Oral Monitor Sitting Right arm --      Pain Score       2           Vitals:    08/11/23 1915   BP: (!) 161/102   Pulse: 98   Patient Position - Orthostatic VS: Sitting         Visual Acuity      ED Medications  Medications - No data to display    Diagnostic Studies  Results Reviewed     Procedure Component Value Units Date/Time    Chlamydia/GC amplified DNA by PCR [[id number]] Collected: 08/11/23 2026    Lab Status: In process Specimen: Urine, Other Updated: 08/11/23 2051    Trichomonas vaginalis/Mycoplasma genitalium PCR [[id number]] Collected: 08/11/23 2026    Lab Status: In process Specimen: Urine, Clean Catch Updated: 08/11/23 2051    Urine Microscopic [[id number]]  (Normal) Collected: 08/11/23 2026    Lab Status: Final result Specimen: Urine, Clean Catch Updated: 08/11/23 2042     RBC, UA None Seen /hpf      WBC, UA 1-2 /hpf      Epithelial Cells Occasional /hpf      Bacteria, UA Occasional /hpf     UA w Reflex to Microscopic w Reflex to Culture [[id number]]  (Abnormal) Collected: 08/11/23 2026    Lab Status: Final result Specimen: Urine, Clean Catch Updated: 08/11/23 2041     Color, UA Colorless     Clarity, UA Clear     Specific Gravity, UA 1.005     pH, UA 6.5     Leukocytes, UA Negative     Nitrite, UA Negative     Protein, UA Negative mg/dl      Glucose, UA Negative mg/dl      Ketones, UA Negative mg/dl      Urobilinogen, UA <2.0 mg/dl      Bilirubin, UA Negative     Occult Blood, UA Large    POCT pregnancy, urine [[id number]]  (Normal) Resulted: 08/11/23 2027    Lab Status: Final result Updated: 08/11/23 2027     EXT Preg Test, Ur Negative     Control Valid                 No orders to display              Procedures  Procedures         ED Course                                             MDM    Disposition  Final diagnoses:   None     ED Disposition     None      Follow-up Information    None         Patient's Medications   Discharge Prescriptions    No medications on file       No discharge procedures on file.     PDMP Review     None          ED Provider  Electronically Signed by oneself and of sexual partners should be sought to avoid re-infection. Mycoplasma genitalium is an increasingly identified and treatable bacterial infection and may be asymptomatic. Long term complications may result from untreated infections. Sexual transmission is possible. Procedural Limitations  This assay has only been validated for use with male and female urine, clinician-instructed self-collected vaginal swab specimens, clinician-collected vaginal swab specimens, endocervical swab specimens collected in jose maria® PCR Media. This assay also detects TV DNA in cervical specimens collected in PreservCyt® Solution. Assay performance has not been validated for use with other collection media and/or specimen types. Detection of Trichomonas vaginalis and/or Mycoplasma genitalium is dependent on the number of organisms present in the specimen. Detection may be affected by specimen collection methods, patient factors, stage of infection, infecting strains, and presence of PCR inhibitors. A negative result does not preclude the presence of infection as results depend on adequate specimen collection, absence of inhibitors, and sufficient DNA to be detected. All results should be interpreted in conjunction with other clinical and laboratory data. The presence of mucus in endocervical specimens may lead to false or invalid results. Urine testing is recommended to be performed on first catch urine samples. The effects of other collection variables have not been evaluated at this time. The effects of vaginal discharge, tampon use, douching, and other collection variables have not been evaluated at this time. This assay has not been evaluated with patients currently being treated with antimicrobial agents active against TV or MG, or patients with a history of hysterectomy.        Jean amplified DNA by PCR [[id number]]  (Normal) Collected: 08/11/23 2026    Lab Status: Final result Specimen: Urine, Other Updated: 08/14/23 1359     N gonorrhoeae, DNA Probe Negative     Chlamydia trachomatis, DNA Probe Negative    Narrative: This test was performed using the FDA-approved Tanja 6800 CT/NG assay (Roche [employer]). This test uses real-time PCR to detect Chlamydia trachomatis (CT) and Neisseria gonorrhoeae (NG). This instrument and assay have been validated by the  and performing laboratory and verified by the performing laboratory. This test is intended as an aid in the diagnosis of chlamydial and gonococcal disease. This test has not been evaluated in patients younger than 15years of age and is not recommended for evaluation of suspected sexual abuse. This assay is not intended to replace other exams or tests for diagnosis of urogenital infection by causative factors other than Chalmydia trachomatis (CT) and Neisseria gonorrhoeae (NG). Additional testing is recommended when the results do not correlate with clinical signs and symptoms. Procedural Limitations  This assay has only been validated for use with male and female urine, clinician-instructed self-collected vaginal swab specimens, clinician-collected vaginal swab specimens, endocervical swab specimens collected in tanja® PCR Media and cervical specimens collected in PreservCyt® Solution. Assay performance has not been validated for use with other collection media and/or specimen types. Detection of C. trachomatis and [place] is dependent on the number of organisms present in the specimen. Detection may be affected by specimen collection methods, patient factors, stage of infection, infecting strains, and presence of polymerase/PCR inhibitors. When CT is present at very high concentrations, the detection of NG present at concentrations near the limit of detection may be impacted. The presence of mucus in endocervical specimens may lead to false negative results.   The presence of whole blood in urine and cervical specimens collected in PreservCyt Solution may lead to false negative and/or invalid test results. Urine testing is recommended to be performed on first catch urine samples. The effects of other collection variables have not been evaluated at this time. The effects of vaginal discharge, tampon use, douching, and other collection variables have not been evaluated at this time. This assay has not been evaluated with patients currently being treated with antimicrobial agents active against CT or NG, or patients with a history of hysterectomy.      Urine Microscopic [[id number]]  (Normal) Collected: 08/11/23 2026    Lab Status: Final result Specimen: Urine, Clean Catch Updated: 08/11/23 2042     RBC, UA None Seen /hpf      WBC, UA 1-2 /hpf      Epithelial Cells Occasional /hpf      Bacteria, UA Occasional /hpf     UA w Reflex to Microscopic w Reflex to Culture [[id number]]  (Abnormal) Collected: 08/11/23 2026    Lab Status: Final result Specimen: Urine, Clean Catch Updated: 08/11/23 2041     Color, UA Colorless     Clarity, UA Clear     Specific Gravity, UA 1.005     pH, UA 6.5     Leukocytes, UA Negative     Nitrite, UA Negative     Protein, UA Negative mg/dl      Glucose, UA Negative mg/dl      Ketones, UA Negative mg/dl      Urobilinogen, UA <2.0 mg/dl      Bilirubin, UA Negative     Occult Blood, UA Large    POCT pregnancy, urine [[id number]]  (Normal) Resulted: 08/11/23 2027    Lab Status: Final result Updated: 08/11/23 2027     EXT Preg Test, Ur Negative     Control Valid                 No orders to display              Procedures  Procedures         ED Course                                             Medical Decision Making  Background: 23 y.o. female presents with vaginal pain after intercourse    Differential DX includes but is not limited to: local trauma, less likely ssi, possible candidal infection    Plan: pelvic exam, urine cultures for ssi's, pregnancy test       Amount and/or Complexity of Data Reviewed  Labs: ordered. Disposition  Final diagnoses:   Vaginal pain     Time reflects when diagnosis was documented in both MDM as applicable and the Disposition within this note     Time User Action Codes Description Comment    8/11/2023  9:05 PM Isabella Barba Add [R10.2] Vaginal pain       ED Disposition     ED Disposition   Discharge    Condition   Stable    Date/Time   Fri Aug 11, 2023  9:05 PM    [address] discharge to home/self care. Follow-up Information     Follow up With Specialties Details Why Ivelisse Mathews MD Obstetrics and Gynecology, Obstetrics, Gynecology Schedule an appointment as soon as possible for a visit in 1 week  [address] [facility]  [address] [place] [address] Ave  [phone]            Discharge Medication List as of 8/11/2023  9:06 PM      CONTINUE these medications which have NOT CHANGED    Details   famotidine (PEPCID) 20 mg tablet Take 1 tablet (20 mg total) by mouth 2 (two) times a day for 10 days, Starting Wed 8/4/2021, Until Sat 8/14/2021, Normal      hydrOXYzine pamoate (VISTARIL) 25 mg capsule Take 1 capsule (25 mg total) by mouth daily at bedtime, Starting Sun 9/6/2020, Until Tue 10/6/2020, Normal      metoclopramide (Reglan) 10 mg tablet Take 1 tablet (10 mg total) by mouth every 6 (six) hours, Starting Mon 10/17/2022, Normal      naproxen (Naprosyn) 500 mg tablet Take 1 tablet (500 mg total) by mouth 2 (two) times a day with meals, Starting Mon 10/17/2022, Normal      nitrofurantoin (MACROBID) 100 mg capsule Take 1 capsule (100 mg total) by mouth 2 (two) times a day, Starting Sun 3/20/2022, Normal      venlafaxine (EFFEXOR-XR) 75 mg 24 hr capsule Take 1 capsule (75 mg total) by mouth daily, Starting Sun 9/6/2020, Until Tue 10/6/2020, Normal             No discharge procedures on file.     PDMP Review     None          ED Provider  Electronically Signed by           Gil Davila MD  08/15/23 0204

## 2023-08-14 LAB
C TRACH DNA SPEC QL NAA+PROBE: NEGATIVE
M GENITALIUM DNA SPEC QL NAA+PROBE: NEGATIVE
N GONORRHOEA DNA SPEC QL NAA+PROBE: NEGATIVE
T VAGINALIS DNA SPEC QL NAA+PROBE: NEGATIVE

## 2024-01-02 NOTE — ED PROVIDER NOTES
History  Chief Complaint   Patient presents with    Medical Problem     patient C/O finger pain , eating wheat with a sensitivity now complains of numbness in fingertips and requesting a covid test     HPI     23-year-old female presenting to the emergency department requesting a COVID test   The patient states that 2 weeks ago she was around a family member who subsequently tested positive for COVID  She denies having fevers, cough, body aches, vomiting, diarrhea, or shortness of breath, but requests to be tested  Additionally, patient states that she thinks that she has a sensitivity to wheat  She ate a pizza yesterday, and shortly thereafter began to feel anxious thinking that she was having a reaction to the pizza  She tells me that she briefly felt like her heart was racing, with hyperventilating, and developed tingling in her fingertips  Her symptoms resolved when she was no longer anxious  No hives or throat swelling  Patient denies chest pain, shortness of breath, or palpitations since the discrete episode at home yesterday when she was feeling anxious  Additionally, patient requests a wound check of a laceration to the ulnar aspect of her left 5th finger that occurred a week ago when she cut her finger on a  at work  She was seen at urgent care where she had sutures placed to the area  She noticed a small amount of redness around the sutures and is concerned that it might be infected  No drainage  Area is mildly tender to the touch without swelling  Prior to Admission Medications   Prescriptions Last Dose Informant Patient Reported?  Taking?   famotidine (PEPCID) 20 mg tablet   No No   Sig: Take 1 tablet (20 mg total) by mouth 2 (two) times a day for 10 days   hydrOXYzine pamoate (VISTARIL) 25 mg capsule   No No   Sig: Take 1 capsule (25 mg total) by mouth daily at bedtime   venlafaxine (EFFEXOR-XR) 75 mg 24 hr capsule   No No   Sig: Take 1 capsule (75 mg total) by mouth daily      Facility-Administered Medications: None       Past Medical History:   Diagnosis Date    Anxiety     Depression     Migraines        History reviewed  No pertinent surgical history  History reviewed  No pertinent family history  I have reviewed and agree with the history as documented  E-Cigarette/Vaping    E-Cigarette Use Never User      E-Cigarette/Vaping Substances     Social History     Tobacco Use    Smoking status: Never Smoker    Smokeless tobacco: Never Used   Vaping Use    Vaping Use: Never used   Substance Use Topics    Alcohol use: Not Currently    Drug use: Not Currently       Review of Systems   Constitutional: Negative for chills and fever  HENT: Negative for congestion  Eyes: Negative for visual disturbance  Respiratory: Negative for cough and shortness of breath  Cardiovascular: Negative for chest pain and leg swelling  Gastrointestinal: Negative for abdominal pain, diarrhea, nausea and vomiting  Genitourinary: Negative for dysuria and frequency  Musculoskeletal: Negative for arthralgias, back pain, neck pain and neck stiffness  Skin: Positive for wound (sutured wound to L 5th finger)  Negative for rash  Neurological: Negative for dizziness, weakness, light-headedness, numbness and headaches  Tingling in the fingertips, resolved   Psychiatric/Behavioral: Negative for agitation, behavioral problems, confusion and dysphoric mood  The patient is nervous/anxious  Physical Exam  Physical Exam  Constitutional:       General: She is not in acute distress  Appearance: She is well-developed  She is not diaphoretic  HENT:      Head: Normocephalic and atraumatic  Right Ear: External ear normal       Left Ear: External ear normal       Nose: Nose normal    Eyes:      Conjunctiva/sclera: Conjunctivae normal    Cardiovascular:      Rate and Rhythm: Normal rate and regular rhythm  Heart sounds: Normal heart sounds  No murmur heard     No friction rub  No gallop  Pulmonary:      Effort: Pulmonary effort is normal  No respiratory distress  Breath sounds: Normal breath sounds  No wheezing or rales  Abdominal:      General: Bowel sounds are normal  There is no distension  Palpations: Abdomen is soft  Tenderness: There is no abdominal tenderness  There is no guarding  Musculoskeletal:         General: No deformity  Normal range of motion  Cervical back: Normal range of motion and neck supple  Comments: Full ROM of the joints of the L 5th finger   Skin:     General: Skin is warm and dry  Comments: 2 cm healing laceration with sutures in place to the ulnar aspect of the distal L 5th finger  Scant erythema surrounding suture line  No drainage or swelling  No significant tenderness to palpation  Neurological:      Mental Status: She is alert and oriented to person, place, and time  Motor: No abnormal muscle tone  Comments: Face symmetric, tongue midline, 5/5 strength in the proximal and distal upper and lower extremities bilaterally with intact sensation to light touch throughout  CN II-XII intact  Normal finger-to-nose, rapid alternating movements, and heel-to-shin bilaterally  Normal speech, normal gait  No pronator drift        Psychiatric:         Mood and Affect: Mood normal          Vital Signs  ED Triage Vitals [09/08/21 1516]   Temperature Pulse Respirations Blood Pressure SpO2   99 °F (37 2 °C) 100 18 (!) 142/102 100 %      Temp Source Heart Rate Source Patient Position - Orthostatic VS BP Location FiO2 (%)   Oral Monitor Sitting Left arm --      Pain Score       --           Vitals:    09/08/21 1516   BP: (!) 142/102   Pulse: 100   Patient Position - Orthostatic VS: Sitting         Visual Acuity      ED Medications  Medications - No data to display    Diagnostic Studies  Results Reviewed     Procedure Component Value Units Date/Time    [id number]  (Normal) BACK PAIN/PAIN Collected: 09/08/21 1834    Lab Status: Final result Specimen: Nares from Nose Updated: 09/08/21 2024     SARS-CoV-2 Negative    Narrative: The specimen collection materials, transport medium, and/or testing methodology utilized in the production of these test results have been proven to be reliable in a limited validation with an abbreviated program under the Emergency Utilization Authorization provided by the FDA  Testing reported as "Presumptive positive" will be confirmed with secondary testing to ensure result accuracy  Clinical caution and judgement should be used with the interpretation of these results with consideration of the clinical impression and other laboratory testing  Testing reported as "Positive" or "Negative" has been proven to be accurate according to standard laboratory validation requirements  All testing is performed with control materials showing appropriate reactivity at standard intervals  No orders to display              Procedures  Procedures         ED Course                                           MDM  Number of Diagnoses or Management Options  Encounter for screening laboratory testing for COVID-19 virus: new and requires workup  Paresthesia  Visit for wound check  Diagnosis management comments: Well appearing  Nontoxic  Afebrile and hemodynamically stable  Patient is asymptomatic currently  Normal neurologic exam     The laceration to her left 5th finger appears to be healing well  No swelling or drainage  Mild surrounding erythema without red streaking or evidence of cellulitis  Patient counseled to follow-up for suture removal as previously scheduled  Description of heart racing, hyperventilation, and tingling of the fingertips while anxious yesterday is most consistent with a panic attack  Symptoms resolved once she was no longer anxious  At the patient's request will test for COVID-19  Precautions for quaranting discussed       Reasons to return to the Emergency Department discussed  Amount and/or Complexity of Data Reviewed  Clinical lab tests: ordered    Patient Progress  Patient progress: stable           Disposition  Final diagnoses:   Encounter for screening laboratory testing for COVID-19 virus   Visit for wound check   Paresthesia     Time reflects when diagnosis was documented in both MDM as applicable and the Disposition within this note     Time User Action Codes Description Comment    9/8/2021  6:34 PM Merlos Due Add [Z20 822] Encounter for screening laboratory testing for COVID-19 virus     9/8/2021  6:34 PM Merlos Due Add [Z51 89] Visit for wound check     9/8/2021  6:35 PM Merlos Due Add [R20 2] Paresthesia       ED Disposition     ED Disposition Condition Date/Time Comment    Discharge Stable Wed Sep 8, 2021  6:34 PM Nicki Negron discharge to home/self care  Follow-up Information     Follow up With Specialties Details Why Contact Info Additional [facility] Emergency Department Emergency Medicine  As we discussed, return to the Emergency Department for redness and swelling to your finger, fever, weakness or numbness on one side of your body, trouble breathing, chest pain, or thoughts of harming yourself   [address] Emergency Department, [address], [place], [place], [address]          Discharge Medication List as of 9/8/2021  6:35 PM      CONTINUE these medications which have NOT CHANGED    Details   famotidine (PEPCID) 20 mg tablet Take 1 tablet (20 mg total) by mouth 2 (two) times a day for 10 days, Starting Wed 8/4/2021, Until Sat 8/14/2021, Normal      hydrOXYzine pamoate (VISTARIL) 25 mg capsule Take 1 capsule (25 mg total) by mouth daily at bedtime, Starting Sun 9/6/2020, Until Tue 10/6/2020, Normal      venlafaxine (EFFEXOR-XR) 75 mg 24 hr capsule Take 1 capsule (75 mg total) by mouth daily, Starting Sun 9/6/2020, Until Tue 10/6/2020, Normal           No discharge procedures on file      PDMP Review     None          ED Provider  Electronically Signed by           Arnav Shelley MD  09/08/21 9241

## 2025-01-11 ENCOUNTER — HOSPITAL ENCOUNTER (EMERGENCY)
Facility: HOSPITAL | Age: 25
Discharge: HOME | End: 2025-01-11
Attending: EMERGENCY MEDICINE
Payer: MEDICARE

## 2025-01-11 VITALS
RESPIRATION RATE: 18 BRPM | TEMPERATURE: 98.2 F | OXYGEN SATURATION: 100 % | HEART RATE: 100 BPM | WEIGHT: 130.07 LBS | BODY MASS INDEX: 23.04 KG/M2 | SYSTOLIC BLOOD PRESSURE: 143 MMHG | DIASTOLIC BLOOD PRESSURE: 90 MMHG

## 2025-01-11 DIAGNOSIS — B34.9 VIRAL ILLNESS: Primary | ICD-10-CM

## 2025-01-11 DIAGNOSIS — L50.9 URTICARIA: ICD-10-CM

## 2025-01-11 LAB
FLUAV AG UPPER RESP QL IA.RAPID: NEGATIVE
FLUBV AG UPPER RESP QL IA.RAPID: NEGATIVE
SARS-COV+SARS-COV-2 AG RESP QL IA.RAPID: NEGATIVE

## 2025-01-11 PROCEDURE — 99283 EMERGENCY DEPT VISIT LOW MDM: CPT

## 2025-01-11 PROCEDURE — 87804 INFLUENZA ASSAY W/OPTIC: CPT | Performed by: EMERGENCY MEDICINE

## 2025-01-11 PROCEDURE — 87811 SARS-COV-2 COVID19 W/OPTIC: CPT | Performed by: EMERGENCY MEDICINE

## 2025-01-12 NOTE — DISCHARGE INSTRUCTIONS
Recommend tea with or without honey in it an vocal rest to help with your hoarse voice. Continue taking allergy medication to help with rash. If it continues to bother you can use a topical steroid cream or benadryl as well. Follow up with primary care if your voice does not improve over the next week as your other symptoms improve. Return to the ER if symptoms worsen.

## 2025-01-12 NOTE — ED PROVIDER NOTES
Time reflects when diagnosis was documented in both MDM as applicable and the Disposition within this note       Time User Action Codes Description Comment    1/11/2025  8:10 PM Varun Josie Add [B34.9] Viral illness     1/11/2025  8:10 PM Josie Bond Add [L50.9] Urticaria           ED Disposition       ED Disposition   Discharge    Condition   Stable    Date/Time   Sat Jan 11, 2025  8:10 PM    Comment   Lali Sandoval discharge to home/self care.                   Assessment & Plan       Medical Decision Making  Patient seen, examined and noted to have viral illness and urticaria.  Patient coming in with complaints of a hoarse voice for 4 days as well as cough, congestion and sore throat.  Also complaining of a rash after trying on a dress she ordered online however this rash is resolving with over-the-counter allergy medications.  She is treating her congestion with [place] which she states is helping.  Wondering if there is anything she can do for her hoarse voice.  Recommended vocal rest, tea, tea with honey and it and following up with primary care if hoarse voice does not resolve as her URI symptoms resolved and persists past 2 to 3 weeks.  All questions answered.  Primary care follow-up encouraged.  Return precautions were discussed.    Differential diagnosis includes but is not limited to flu, COVID, strep, mono, viral URI, pneumonia, OM, urticaria    Patient's labs notable for: Flu/COVID-negative     Patient appears well, is nontoxic appearing, she expresses understanding and agrees with plan of care at this time.  In light of this patient would benefit from outpatient management.    Patient at time of discharge well-appearing in no acute distress, all questions answered. Patient agreeable to plan.  Patient's vitals, lab/imaging results, diagnosis, and treatment plan were discussed with the patient. All new/changed medications were discussed with patient, specifically, route of administration,  how often and when to take, and where they can be picked up. Strict return precautions as well as close follow up with PCP was discussed with the patient and the patient was agreeable to my recommendations. Patient verbally acknowledged understanding of the above communications. Strict return precautions were provided. All labs reviewed and utilized in the medical decision making process (if labs were ordered).    Amount and/or Complexity of Data Reviewed  Labs: ordered.             Medications - No data to display    ED Risk Strat Scores                                              History of Present Illness       Chief Complaint   Patient presents with    Flu Symptoms     Lost voice 4 days ago, +cough/congestion/sore throat, shivering but no fever. Pt also c/o rash on L side that is going away.        Past Medical History:   Diagnosis Date    Anxiety     Depression     Migraines       History reviewed. No pertinent surgical history.   History reviewed. No pertinent family history.   Social History     Tobacco Use    Smoking status: Never    Smokeless tobacco: Never   Vaping Use    Vaping status: Never Used   Substance Use Topics    Alcohol use: Not Currently    Drug use: Not Currently      E-Cigarette/Vaping    E-Cigarette Use Never User       E-Cigarette/Vaping Substances      I have reviewed and agree with the history as documented.     Lali Sandoval is a 24 y.o. female presenting to the ED on January 11, 2025 with flu symptoms. Patient endorses that she has had a cough, congestion, sore throat for the last 2 days and lost her voice about 4 days ago. Also notes that she felt the chills overnight but has not had any fevers. She also notes that she has had a rash on her left side that is slowly going away. Rash began about a week ago after she tried on a dress that she ordered online. She has been taking OTC generic Claritin and rash has been improving. No one else around her has a similar rash. Has not had  fevers at home. Patient denies abdominal pain, nausea, vomiting or any other complaints at this time.         Flu Symptoms  Presenting symptoms: cough and sore throat    Presenting symptoms: no fever, no headaches, no nausea, no shortness of breath and no vomiting    Associated symptoms: chills and nasal congestion    Associated symptoms: no ear pain        Review of Systems   Constitutional:  Positive for chills. Negative for fever.   HENT:  Positive for congestion, sore throat and voice change. Negative for ear pain.    Eyes:  Negative for visual disturbance.   Respiratory:  Positive for cough. Negative for shortness of breath.    Cardiovascular:  Negative for chest pain and palpitations.   Gastrointestinal:  Negative for abdominal pain, nausea and vomiting.   Skin:  Positive for rash. Negative for color change.   Neurological:  Negative for headaches.   All other systems reviewed and are negative.          Objective       ED Triage Vitals [01/11/25 1818]   Temperature Pulse Blood Pressure Respirations SpO2 Patient Position - Orthostatic VS   98.2 °F (36.8 °C) 100 143/90 18 100 % Sitting      Temp Source Heart Rate Source BP Location FiO2 (%) Pain Score    Oral Monitor Right arm -- --      Vitals      Date and Time Temp Pulse SpO2 Resp BP Pain Score FACES Pain Rating User   01/11/25 1818 98.2 °F (36.8 °C) 100 100 % 18 143/90 -- -- DO            Physical Exam  Vitals and nursing note reviewed.   Constitutional:       General: She is not in acute distress.     Appearance: Normal appearance. She is normal weight. She is not ill-appearing, toxic-appearing or diaphoretic.   HENT:      Head: Normocephalic and atraumatic.      Right Ear: Tympanic membrane, ear canal and external ear normal. There is no impacted cerumen.      Left Ear: Tympanic membrane, ear canal and external ear normal. There is no impacted cerumen.      Nose: Nose normal. No congestion or rhinorrhea.      Mouth/Throat:      Mouth: Mucous membranes are  moist.      Pharynx: Oropharynx is clear. Posterior oropharyngeal erythema present. No oropharyngeal exudate.   Eyes:      General: No scleral icterus.        Right eye: No discharge.         Left eye: No discharge.      Extraocular Movements: Extraocular movements intact.      Conjunctiva/sclera: Conjunctivae normal.   Cardiovascular:      Rate and Rhythm: Normal rate and regular rhythm.      Heart sounds: Normal heart sounds. No murmur heard.     No friction rub. No gallop.   Pulmonary:      Effort: Pulmonary effort is normal. No respiratory distress.      Breath sounds: Normal breath sounds. No wheezing, rhonchi or rales.   Abdominal:      General: Abdomen is flat.   Musculoskeletal:         General: No signs of injury. Normal range of motion.      Cervical back: Normal range of motion and neck supple. No rigidity.   Skin:     General: Skin is warm.      Capillary Refill: Capillary refill takes less than 2 seconds.      Coloration: Skin is not pale.      Findings: Rash present. No erythema.      Comments: Small circular macules of erythema on patient's left side of torso and inner portion of left arm.    Neurological:      General: No focal deficit present.      Mental Status: She is alert and oriented to person, place, and time. Mental status is at baseline.      Motor: No weakness.      Gait: Gait normal.   Psychiatric:         Mood and Affect: Mood normal.         Behavior: Behavior normal.         Results Reviewed       Procedure Component Value Units Date/Time    FLU/COVID Rapid Antigen (30 min. TAT) - Preferred screening test in ED [[id number]]  (Normal) Collected: 01/11/25 1819    Lab Status: Final result Specimen: Nares from Nose Updated: 01/11/25 1907     SARS COV Rapid Antigen Negative     Influenza A Rapid Antigen Negative     Influenza B Rapid Antigen Negative    Narrative:      This test has been performed using the Quidel Lali 2 FLU+SARS Antigen test under the Emergency Use Authorization (EUA). This  test has been validated by the  and verified by the performing laboratory. The Lali uses lateral flow immunofluorescent sandwich assay to detect SARS-COV, Influenza A and Influenza B Antigen.     The Quidel Lali 2 SARS Antigen test does not differentiate between SARS-CoV and SARS-CoV-2.     Negative results are presumptive and may be confirmed with a molecular assay, if necessary, for patient management. Negative results do not rule out SARS-CoV-2 or influenza infection and should not be used as the sole basis for treatment or patient management decisions. A negative test result may occur if the level of antigen in a sample is below the limit of detection of this test.     Positive results are indicative of the presence of viral antigens, but do not rule out bacterial infection or co-infection with other viruses.     All test results should be used as an adjunct to clinical observations and other information available to the provider.    FOR PEDIATRIC PATIENTS - copy/paste COVID Guidelines URL to browser: [email or web address]            No orders to display       Procedures    ED Medication and Procedure Management   Prior to Admission Medications   Prescriptions Last Dose Informant Patient Reported? Taking?   famotidine (PEPCID) 20 mg tablet   No No   Sig: Take 1 tablet (20 mg total) by mouth 2 (two) times a day for 10 days   hydrOXYzine pamoate (VISTARIL) 25 mg capsule   No No   Sig: Take 1 capsule (25 mg total) by mouth daily at bedtime   metoclopramide (Reglan) 10 mg tablet   No No   Sig: Take 1 tablet (10 mg total) by mouth every 6 (six) hours   naproxen (Naprosyn) 500 mg tablet   No No   Sig: Take 1 tablet (500 mg total) by mouth 2 (two) times a day with meals   nitrofurantoin (MACROBID) 100 mg capsule   No No   Sig: Take 1 capsule (100 mg total) by mouth 2 (two) times a day   venlafaxine (EFFEXOR-XR) 75 mg 24 hr capsule   No No   Sig: Take 1  capsule (75 mg total) by mouth daily      Facility-Administered Medications: None     Discharge Medication List as of 1/11/2025  8:12 PM        CONTINUE these medications which have NOT CHANGED    Details   famotidine (PEPCID) 20 mg tablet Take 1 tablet (20 mg total) by mouth 2 (two) times a day for 10 days, Starting Wed 8/4/2021, Until Sat 8/14/2021, Normal      hydrOXYzine pamoate (VISTARIL) 25 mg capsule Take 1 capsule (25 mg total) by mouth daily at bedtime, Starting Sun 9/6/2020, Until Tue 10/6/2020, Normal      metoclopramide (Reglan) 10 mg tablet Take 1 tablet (10 mg total) by mouth every 6 (six) hours, Starting Mon 10/17/2022, Normal      naproxen (Naprosyn) 500 mg tablet Take 1 tablet (500 mg total) by mouth 2 (two) times a day with meals, Starting Mon 10/17/2022, Normal      nitrofurantoin (MACROBID) 100 mg capsule Take 1 capsule (100 mg total) by mouth 2 (two) times a day, Starting Sun 3/20/2022, Normal      venlafaxine (EFFEXOR-XR) 75 mg 24 hr capsule Take 1 capsule (75 mg total) by mouth daily, Starting Sun 9/6/2020, Until Tue 10/6/2020, Normal           No discharge procedures on file.  ED SEPSIS DOCUMENTATION   Time reflects when diagnosis was documented in both MDM as applicable and the Disposition within this note       Time User Action Codes Description Comment    1/11/2025  8:10 PM Josie Bond [B34.9] Viral illness     1/11/2025  8:10 PM Josie Bond [L50.9] Urticaria                  Josie Bond PA-C  01/12/25 3927